# Patient Record
Sex: MALE | Race: ASIAN | NOT HISPANIC OR LATINO | Employment: UNEMPLOYED | URBAN - METROPOLITAN AREA
[De-identification: names, ages, dates, MRNs, and addresses within clinical notes are randomized per-mention and may not be internally consistent; named-entity substitution may affect disease eponyms.]

---

## 2017-02-28 ENCOUNTER — OFFICE VISIT - HEALTHEAST (OUTPATIENT)
Dept: FAMILY MEDICINE | Facility: CLINIC | Age: 72
End: 2017-02-28

## 2017-02-28 DIAGNOSIS — R05.9 COUGH: ICD-10-CM

## 2017-02-28 DIAGNOSIS — R06.02 SOB (SHORTNESS OF BREATH): ICD-10-CM

## 2017-02-28 ASSESSMENT — MIFFLIN-ST. JEOR: SCORE: 1143.45

## 2017-03-21 ENCOUNTER — OFFICE VISIT - HEALTHEAST (OUTPATIENT)
Dept: FAMILY MEDICINE | Facility: CLINIC | Age: 72
End: 2017-03-21

## 2017-03-21 DIAGNOSIS — M17.0 OSTEOARTHRITIS OF KNEES, BILATERAL: ICD-10-CM

## 2017-03-21 DIAGNOSIS — I10 HYPERTENSION: ICD-10-CM

## 2017-03-21 ASSESSMENT — MIFFLIN-ST. JEOR: SCORE: 1146.56

## 2017-08-09 ENCOUNTER — COMMUNICATION - HEALTHEAST (OUTPATIENT)
Dept: FAMILY MEDICINE | Facility: CLINIC | Age: 72
End: 2017-08-09

## 2017-08-09 DIAGNOSIS — E78.00 HYPERCHOLESTEREMIA: ICD-10-CM

## 2017-08-09 DIAGNOSIS — I63.81 LACUNAR INFARCTION (H): ICD-10-CM

## 2017-09-06 ENCOUNTER — COMMUNICATION - HEALTHEAST (OUTPATIENT)
Dept: FAMILY MEDICINE | Facility: CLINIC | Age: 72
End: 2017-09-06

## 2017-09-06 DIAGNOSIS — K29.70 GASTRITIS: ICD-10-CM

## 2017-09-06 DIAGNOSIS — F43.21 ADJUSTMENT DISORDER WITH DEPRESSED MOOD: ICD-10-CM

## 2018-01-25 ENCOUNTER — COMMUNICATION - HEALTHEAST (OUTPATIENT)
Dept: FAMILY MEDICINE | Facility: CLINIC | Age: 73
End: 2018-01-25

## 2018-01-25 DIAGNOSIS — E78.00 HYPERCHOLESTEREMIA: ICD-10-CM

## 2018-01-25 DIAGNOSIS — I63.81 LACUNAR INFARCTION (H): ICD-10-CM

## 2018-02-26 ENCOUNTER — COMMUNICATION - HEALTHEAST (OUTPATIENT)
Dept: FAMILY MEDICINE | Facility: CLINIC | Age: 73
End: 2018-02-26

## 2018-02-26 DIAGNOSIS — M17.0 OSTEOARTHRITIS OF KNEES, BILATERAL: ICD-10-CM

## 2018-02-26 DIAGNOSIS — F43.21 ADJUSTMENT DISORDER WITH DEPRESSED MOOD: ICD-10-CM

## 2018-02-26 DIAGNOSIS — K29.70 GASTRITIS: ICD-10-CM

## 2018-02-26 DIAGNOSIS — I10 HYPERTENSION: ICD-10-CM

## 2018-04-25 ENCOUNTER — COMMUNICATION - HEALTHEAST (OUTPATIENT)
Dept: FAMILY MEDICINE | Facility: CLINIC | Age: 73
End: 2018-04-25

## 2018-04-25 DIAGNOSIS — E78.00 HYPERCHOLESTEREMIA: ICD-10-CM

## 2018-04-25 DIAGNOSIS — I63.81 LACUNAR INFARCTION (H): ICD-10-CM

## 2018-05-23 ENCOUNTER — COMMUNICATION - HEALTHEAST (OUTPATIENT)
Dept: FAMILY MEDICINE | Facility: CLINIC | Age: 73
End: 2018-05-23

## 2018-05-23 DIAGNOSIS — F43.21 ADJUSTMENT DISORDER WITH DEPRESSED MOOD: ICD-10-CM

## 2018-05-23 DIAGNOSIS — I10 HYPERTENSION: ICD-10-CM

## 2018-05-23 DIAGNOSIS — K29.70 GASTRITIS: ICD-10-CM

## 2018-07-18 ENCOUNTER — COMMUNICATION - HEALTHEAST (OUTPATIENT)
Dept: FAMILY MEDICINE | Facility: CLINIC | Age: 73
End: 2018-07-18

## 2018-07-18 DIAGNOSIS — I63.81 LACUNAR INFARCTION (H): ICD-10-CM

## 2018-07-18 DIAGNOSIS — E78.00 HYPERCHOLESTEREMIA: ICD-10-CM

## 2018-08-18 ENCOUNTER — COMMUNICATION - HEALTHEAST (OUTPATIENT)
Dept: FAMILY MEDICINE | Facility: CLINIC | Age: 73
End: 2018-08-18

## 2018-08-18 DIAGNOSIS — I10 HYPERTENSION: ICD-10-CM

## 2018-08-18 DIAGNOSIS — K29.70 GASTRITIS: ICD-10-CM

## 2018-08-18 DIAGNOSIS — F43.21 ADJUSTMENT DISORDER WITH DEPRESSED MOOD: ICD-10-CM

## 2019-02-05 ENCOUNTER — COMMUNICATION - HEALTHEAST (OUTPATIENT)
Dept: FAMILY MEDICINE | Facility: CLINIC | Age: 74
End: 2019-02-05

## 2019-02-05 DIAGNOSIS — M17.0 OSTEOARTHRITIS OF KNEES, BILATERAL: ICD-10-CM

## 2019-02-25 ENCOUNTER — AMBULATORY - HEALTHEAST (OUTPATIENT)
Dept: CARE COORDINATION | Facility: CLINIC | Age: 74
End: 2019-02-25

## 2019-02-25 ENCOUNTER — COMMUNICATION - HEALTHEAST (OUTPATIENT)
Dept: FAMILY MEDICINE | Facility: CLINIC | Age: 74
End: 2019-02-25

## 2019-02-26 ENCOUNTER — COMMUNICATION - HEALTHEAST (OUTPATIENT)
Dept: CARE COORDINATION | Facility: CLINIC | Age: 74
End: 2019-02-26

## 2019-02-27 ENCOUNTER — OFFICE VISIT - HEALTHEAST (OUTPATIENT)
Dept: FAMILY MEDICINE | Facility: CLINIC | Age: 74
End: 2019-02-27

## 2019-02-27 DIAGNOSIS — K56.609 SBO (SMALL BOWEL OBSTRUCTION) (H): ICD-10-CM

## 2019-02-27 DIAGNOSIS — Z12.11 SCREEN FOR COLON CANCER: ICD-10-CM

## 2019-02-27 ASSESSMENT — MIFFLIN-ST. JEOR: SCORE: 1144.29

## 2019-04-26 ENCOUNTER — COMMUNICATION - HEALTHEAST (OUTPATIENT)
Dept: FAMILY MEDICINE | Facility: CLINIC | Age: 74
End: 2019-04-26

## 2019-04-29 ENCOUNTER — OFFICE VISIT - HEALTHEAST (OUTPATIENT)
Dept: FAMILY MEDICINE | Facility: CLINIC | Age: 74
End: 2019-04-29

## 2019-04-29 DIAGNOSIS — D64.9 ANEMIA, UNSPECIFIED TYPE: ICD-10-CM

## 2019-04-29 DIAGNOSIS — Z87.19 HISTORY OF GI BLEED: ICD-10-CM

## 2019-04-29 DIAGNOSIS — M17.0 PRIMARY OSTEOARTHRITIS OF BOTH KNEES: ICD-10-CM

## 2019-04-29 DIAGNOSIS — R10.84 ABDOMINAL PAIN, GENERALIZED: ICD-10-CM

## 2019-04-29 LAB
ERYTHROCYTE [DISTWIDTH] IN BLOOD BY AUTOMATED COUNT: 12.1 % (ref 11–14.5)
HCT VFR BLD AUTO: 37.4 % (ref 40–54)
HGB BLD-MCNC: 12.6 G/DL (ref 14–18)
MCH RBC QN AUTO: 32.3 PG (ref 27–34)
MCHC RBC AUTO-ENTMCNC: 33.8 G/DL (ref 32–36)
MCV RBC AUTO: 96 FL (ref 80–100)
PLATELET # BLD AUTO: 171 THOU/UL (ref 140–440)
PMV BLD AUTO: 8.3 FL (ref 7–10)
RBC # BLD AUTO: 3.91 MILL/UL (ref 4.4–6.2)
WBC: 4.8 THOU/UL (ref 4–11)

## 2019-04-29 ASSESSMENT — MIFFLIN-ST. JEOR: SCORE: 1122.18

## 2019-05-01 ENCOUNTER — RECORDS - HEALTHEAST (OUTPATIENT)
Dept: ADMINISTRATIVE | Facility: OTHER | Age: 74
End: 2019-05-01

## 2019-05-20 ENCOUNTER — AMBULATORY - HEALTHEAST (OUTPATIENT)
Dept: NURSING | Facility: CLINIC | Age: 74
End: 2019-05-20

## 2019-05-20 ENCOUNTER — COMMUNICATION - HEALTHEAST (OUTPATIENT)
Dept: FAMILY MEDICINE | Facility: CLINIC | Age: 74
End: 2019-05-20

## 2019-06-03 ENCOUNTER — OFFICE VISIT - HEALTHEAST (OUTPATIENT)
Dept: FAMILY MEDICINE | Facility: CLINIC | Age: 74
End: 2019-06-03

## 2019-06-03 DIAGNOSIS — Z87.19 HISTORY OF GI BLEED: ICD-10-CM

## 2019-06-03 DIAGNOSIS — K29.70 GASTRITIS, PRESENCE OF BLEEDING UNSPECIFIED, UNSPECIFIED CHRONICITY, UNSPECIFIED GASTRITIS TYPE: ICD-10-CM

## 2019-06-03 DIAGNOSIS — F43.21 ADJUSTMENT DISORDER WITH DEPRESSED MOOD: ICD-10-CM

## 2019-06-03 ASSESSMENT — MIFFLIN-ST. JEOR: SCORE: 1126.72

## 2019-07-25 ENCOUNTER — COMMUNICATION - HEALTHEAST (OUTPATIENT)
Dept: FAMILY MEDICINE | Facility: CLINIC | Age: 74
End: 2019-07-25

## 2019-07-25 DIAGNOSIS — I63.81 LACUNAR INFARCTION (H): ICD-10-CM

## 2019-07-25 DIAGNOSIS — E78.00 HYPERCHOLESTEREMIA: ICD-10-CM

## 2019-07-25 DIAGNOSIS — K29.70 GASTRITIS: ICD-10-CM

## 2019-07-25 DIAGNOSIS — I10 HYPERTENSION: ICD-10-CM

## 2019-08-22 ENCOUNTER — COMMUNICATION - HEALTHEAST (OUTPATIENT)
Dept: FAMILY MEDICINE | Facility: CLINIC | Age: 74
End: 2019-08-22

## 2019-08-22 DIAGNOSIS — F43.21 ADJUSTMENT DISORDER WITH DEPRESSED MOOD: ICD-10-CM

## 2019-10-02 ENCOUNTER — OFFICE VISIT - HEALTHEAST (OUTPATIENT)
Dept: FAMILY MEDICINE | Facility: CLINIC | Age: 74
End: 2019-10-02

## 2019-10-02 DIAGNOSIS — K29.70 GASTRITIS, PRESENCE OF BLEEDING UNSPECIFIED, UNSPECIFIED CHRONICITY, UNSPECIFIED GASTRITIS TYPE: ICD-10-CM

## 2019-10-02 DIAGNOSIS — I10 ESSENTIAL HYPERTENSION: ICD-10-CM

## 2019-10-02 DIAGNOSIS — M17.0 PRIMARY OSTEOARTHRITIS OF BOTH KNEES: ICD-10-CM

## 2019-10-02 DIAGNOSIS — Z87.19 HISTORY OF GI BLEED: ICD-10-CM

## 2019-10-02 DIAGNOSIS — F43.21 ADJUSTMENT DISORDER WITH DEPRESSED MOOD: ICD-10-CM

## 2019-10-02 ASSESSMENT — MIFFLIN-ST. JEOR: SCORE: 1161.31

## 2019-11-20 ENCOUNTER — AMBULATORY - HEALTHEAST (OUTPATIENT)
Dept: OTHER | Facility: CLINIC | Age: 74
End: 2019-11-20

## 2020-02-03 ENCOUNTER — OFFICE VISIT - HEALTHEAST (OUTPATIENT)
Dept: FAMILY MEDICINE | Facility: CLINIC | Age: 75
End: 2020-02-03

## 2020-02-03 DIAGNOSIS — F43.21 ADJUSTMENT DISORDER WITH DEPRESSED MOOD: ICD-10-CM

## 2020-02-03 DIAGNOSIS — I10 HYPERTENSION: ICD-10-CM

## 2020-02-03 DIAGNOSIS — I63.81 LACUNAR INFARCTION (H): ICD-10-CM

## 2020-02-03 DIAGNOSIS — K08.9 DENTAL DISEASE: ICD-10-CM

## 2020-02-03 DIAGNOSIS — E78.00 HYPERCHOLESTEREMIA: ICD-10-CM

## 2020-02-03 DIAGNOSIS — I10 ESSENTIAL HYPERTENSION: ICD-10-CM

## 2020-02-03 DIAGNOSIS — Z87.19 HISTORY OF GI BLEED: ICD-10-CM

## 2020-02-03 DIAGNOSIS — Z00.00 ROUTINE GENERAL MEDICAL EXAMINATION AT A HEALTH CARE FACILITY: ICD-10-CM

## 2020-02-03 DIAGNOSIS — K29.70 GASTRITIS: ICD-10-CM

## 2020-02-03 ASSESSMENT — MIFFLIN-ST. JEOR: SCORE: 1175.48

## 2020-02-03 ASSESSMENT — PATIENT HEALTH QUESTIONNAIRE - PHQ9: SUM OF ALL RESPONSES TO PHQ QUESTIONS 1-9: 14

## 2020-03-05 ENCOUNTER — OFFICE VISIT - HEALTHEAST (OUTPATIENT)
Dept: FAMILY MEDICINE | Facility: CLINIC | Age: 75
End: 2020-03-05

## 2020-03-05 DIAGNOSIS — I10 ESSENTIAL HYPERTENSION: ICD-10-CM

## 2020-03-05 DIAGNOSIS — J31.0 CHRONIC RHINITIS: ICD-10-CM

## 2020-03-05 LAB
ALBUMIN SERPL-MCNC: 3.7 G/DL (ref 3.5–5)
ALP SERPL-CCNC: 92 U/L (ref 45–120)
ALT SERPL W P-5'-P-CCNC: 23 U/L (ref 0–45)
ANION GAP SERPL CALCULATED.3IONS-SCNC: 9 MMOL/L (ref 5–18)
AST SERPL W P-5'-P-CCNC: 31 U/L (ref 0–40)
BILIRUB SERPL-MCNC: 0.7 MG/DL (ref 0–1)
BUN SERPL-MCNC: 10 MG/DL (ref 8–28)
CALCIUM SERPL-MCNC: 9.1 MG/DL (ref 8.5–10.5)
CHLORIDE BLD-SCNC: 103 MMOL/L (ref 98–107)
CO2 SERPL-SCNC: 27 MMOL/L (ref 22–31)
CREAT SERPL-MCNC: 1.03 MG/DL (ref 0.7–1.3)
GFR SERPL CREATININE-BSD FRML MDRD: >60 ML/MIN/1.73M2
GLUCOSE BLD-MCNC: 64 MG/DL (ref 70–125)
LDLC SERPL CALC-MCNC: 95 MG/DL
POTASSIUM BLD-SCNC: 4.1 MMOL/L (ref 3.5–5)
PROT SERPL-MCNC: 7.3 G/DL (ref 6–8)
SODIUM SERPL-SCNC: 139 MMOL/L (ref 136–145)

## 2020-03-05 ASSESSMENT — MIFFLIN-ST. JEOR: SCORE: 1167.54

## 2020-04-27 ENCOUNTER — RECORDS - HEALTHEAST (OUTPATIENT)
Dept: ADMINISTRATIVE | Facility: OTHER | Age: 75
End: 2020-04-27

## 2020-05-21 ENCOUNTER — RECORDS - HEALTHEAST (OUTPATIENT)
Dept: ADMINISTRATIVE | Facility: OTHER | Age: 75
End: 2020-05-21

## 2020-07-13 ENCOUNTER — RECORDS - HEALTHEAST (OUTPATIENT)
Dept: ADMINISTRATIVE | Facility: OTHER | Age: 75
End: 2020-07-13

## 2020-09-15 ENCOUNTER — OFFICE VISIT - HEALTHEAST (OUTPATIENT)
Dept: FAMILY MEDICINE | Facility: CLINIC | Age: 75
End: 2020-09-15

## 2020-09-15 DIAGNOSIS — K08.89 PAIN, DENTAL: ICD-10-CM

## 2020-09-15 DIAGNOSIS — F43.21 ADJUSTMENT DISORDER WITH DEPRESSED MOOD: ICD-10-CM

## 2020-09-15 DIAGNOSIS — Z87.19 HISTORY OF GI BLEED: ICD-10-CM

## 2020-09-15 DIAGNOSIS — K08.9 DENTAL DISEASE: ICD-10-CM

## 2020-09-15 DIAGNOSIS — K29.70 GASTRITIS: ICD-10-CM

## 2020-09-15 DIAGNOSIS — E78.00 HYPERCHOLESTEREMIA: ICD-10-CM

## 2020-09-15 DIAGNOSIS — Z28.39 IMMUNIZATION DEFICIENCY: ICD-10-CM

## 2020-09-15 DIAGNOSIS — I10 HYPERTENSION: ICD-10-CM

## 2020-09-15 DIAGNOSIS — I63.81 LACUNAR INFARCTION (H): ICD-10-CM

## 2020-09-15 RX ORDER — ATORVASTATIN CALCIUM 80 MG/1
80 TABLET, FILM COATED ORAL DAILY
Qty: 90 TABLET | Refills: 3 | Status: SHIPPED | OUTPATIENT
Start: 2020-09-15 | End: 2021-09-20

## 2020-09-15 RX ORDER — AMLODIPINE BESYLATE 5 MG/1
5 TABLET ORAL DAILY
Qty: 90 TABLET | Refills: 3 | Status: SHIPPED | OUTPATIENT
Start: 2020-09-15 | End: 2021-09-20

## 2020-09-15 RX ORDER — ESCITALOPRAM OXALATE 5 MG/1
5 TABLET ORAL DAILY
Qty: 90 TABLET | Refills: 3 | Status: SHIPPED | OUTPATIENT
Start: 2020-09-15 | End: 2021-09-20

## 2020-09-15 ASSESSMENT — MIFFLIN-ST. JEOR: SCORE: 1148.26

## 2020-09-15 ASSESSMENT — PATIENT HEALTH QUESTIONNAIRE - PHQ9: SUM OF ALL RESPONSES TO PHQ QUESTIONS 1-9: 1

## 2020-11-04 ENCOUNTER — RECORDS - HEALTHEAST (OUTPATIENT)
Dept: ADMINISTRATIVE | Facility: OTHER | Age: 75
End: 2020-11-04

## 2021-01-12 ENCOUNTER — COMMUNICATION - HEALTHEAST (OUTPATIENT)
Dept: FAMILY MEDICINE | Facility: CLINIC | Age: 76
End: 2021-01-12

## 2021-01-12 DIAGNOSIS — K29.70 GASTRITIS: ICD-10-CM

## 2021-01-12 DIAGNOSIS — Z87.19 HISTORY OF GI BLEED: ICD-10-CM

## 2021-02-16 ENCOUNTER — RECORDS - HEALTHEAST (OUTPATIENT)
Dept: ADMINISTRATIVE | Facility: OTHER | Age: 76
End: 2021-02-16

## 2021-02-27 ENCOUNTER — AMBULATORY - HEALTHEAST (OUTPATIENT)
Dept: NURSING | Facility: CLINIC | Age: 76
End: 2021-02-27

## 2021-03-12 ENCOUNTER — RECORDS - HEALTHEAST (OUTPATIENT)
Dept: ADMINISTRATIVE | Facility: OTHER | Age: 76
End: 2021-03-12

## 2021-03-15 ENCOUNTER — OFFICE VISIT - HEALTHEAST (OUTPATIENT)
Dept: FAMILY MEDICINE | Facility: CLINIC | Age: 76
End: 2021-03-15

## 2021-03-15 DIAGNOSIS — I10 ESSENTIAL HYPERTENSION: ICD-10-CM

## 2021-03-15 DIAGNOSIS — F43.21 ADJUSTMENT DISORDER WITH DEPRESSED MOOD: ICD-10-CM

## 2021-03-15 DIAGNOSIS — Z11.1 SCREENING FOR TUBERCULOSIS: ICD-10-CM

## 2021-03-15 DIAGNOSIS — Z87.19 HISTORY OF GI BLEED: ICD-10-CM

## 2021-03-15 LAB
ANION GAP SERPL CALCULATED.3IONS-SCNC: 11 MMOL/L (ref 5–18)
BUN SERPL-MCNC: 10 MG/DL (ref 8–28)
CALCIUM SERPL-MCNC: 8.9 MG/DL (ref 8.5–10.5)
CHLORIDE BLD-SCNC: 106 MMOL/L (ref 98–107)
CO2 SERPL-SCNC: 21 MMOL/L (ref 22–31)
CREAT SERPL-MCNC: 0.88 MG/DL (ref 0.7–1.3)
GFR SERPL CREATININE-BSD FRML MDRD: >60 ML/MIN/1.73M2
GLUCOSE BLD-MCNC: 84 MG/DL (ref 70–125)
POTASSIUM BLD-SCNC: 4.2 MMOL/L (ref 3.5–5)
SODIUM SERPL-SCNC: 138 MMOL/L (ref 136–145)

## 2021-03-15 ASSESSMENT — MIFFLIN-ST. JEOR: SCORE: 1166.41

## 2021-03-17 ENCOUNTER — COMMUNICATION - HEALTHEAST (OUTPATIENT)
Dept: FAMILY MEDICINE | Facility: CLINIC | Age: 76
End: 2021-03-17

## 2021-03-17 LAB
GAMMA INTERFERON BACKGROUND BLD IA-ACNC: 0.17 IU/ML
M TB IFN-G BLD-IMP: NEGATIVE
MITOGEN IGNF BCKGRD COR BLD-ACNC: 0 IU/ML
MITOGEN IGNF BCKGRD COR BLD-ACNC: 0.09 IU/ML
QTF INTERPRETATION: NORMAL
QTF MITOGEN - NIL: 7.29 IU/ML

## 2021-03-20 ENCOUNTER — AMBULATORY - HEALTHEAST (OUTPATIENT)
Dept: NURSING | Facility: CLINIC | Age: 76
End: 2021-03-20

## 2021-04-27 ENCOUNTER — RECORDS - HEALTHEAST (OUTPATIENT)
Dept: ADMINISTRATIVE | Facility: OTHER | Age: 76
End: 2021-04-27

## 2021-05-06 ENCOUNTER — RECORDS - HEALTHEAST (OUTPATIENT)
Dept: ADMINISTRATIVE | Facility: OTHER | Age: 76
End: 2021-05-06

## 2021-05-27 ASSESSMENT — PATIENT HEALTH QUESTIONNAIRE - PHQ9
SUM OF ALL RESPONSES TO PHQ QUESTIONS 1-9: 1
SUM OF ALL RESPONSES TO PHQ QUESTIONS 1-9: 14

## 2021-05-28 NOTE — TELEPHONE ENCOUNTER
Who is calling:  [facility] COORDINATION    Reason for Call:  WILL BE ORDERING  A WALKER FOR AMBULATION USING CRUNCH AND CANE FOR AMBULATION SCARED OF THE FALL RISK CARE COORDINATIOR WILL BE ORDERING WALKER. ALSO WILL BE ORDERING 20 PULL UPS DUE TO INCONTINENT DRIBBLING, MORE THEN 1 ONCE A WEEK NOT DAILY, DIDN'T SAY HE WAS DEPRESSED BUT HAS SIGNS OF DEPRESSION JUST SLEEPING A LOT AND DENIES ANY SUCIDE , DECLINED BEHAVIORAL HELP SUPPORT ALSO, WOULD LIKE TO HAVE THE HEIGHT AND WEIGHT TO ORDER THE WALKER  , HOME VISIT MADE ON 04/22/19  Date of last appointment with primary care: 02/27/19  Okay to leave a detailed message: Yes

## 2021-05-28 NOTE — TELEPHONE ENCOUNTER
"Patient brought Go Lytely large plastic bottle as welll as Magnesium Citrate, Miralax, and Dulcolax for prep.  Instructions didn't include last three.    Spoke with Whit, who transferred me to [facility] at [phone].    Called that number and spoke with Carmen, who reported that patient only takes GoLytely.    Put other prescriptions into bag and labeled \"Do Not Drink\".      Via , gave instructions for prep per one page document brought with patient.   Printed copy of current med/allergy list.    Patient and daughter indicated understanding.    Thank you.  "

## 2021-05-28 NOTE — PROGRESS NOTES
See telephone encounter this day for details.  Patient and daughter indicated understanding of prep.

## 2021-05-28 NOTE — PROGRESS NOTES
ASSESMENT AND PLAN:  Diagnoses and all orders for this visit:    Abdominal pain, generalized  -     Ambulatory referral to Gastroenterology  -     2(CBC w/o Differential)    History of GI bleed - status post emergent laprotomy in [place] in 2005  -     Ambulatory referral to Gastroenterology  -     [facility](CBC w/o Differential)    Anemia, unspecified type  -     Ambulatory referral to Gastroenterology  -     2(CBC w/o Differential) done today shows a improvement to 12.6 hemoglobin as compared to 12.1 back in February.    Mental health addendum- note received from the [facility]  indicating that the patient's family had indicated during a home visit that he is been having some depression and some suicidal thinking when not feeling well.  I received this letter after the patient visit, the patient is scheduled for follow-up with me in the clinic and we will address this further at that time.  Please see that letter for details, at the time a referral for behavioral health had been recommended but the patient declined.      Primary osteoarthritis of both knees  Because of the concern about GI bleeding detailed below, meloxicam is being discontinued and replaced with acetaminophen as ordered below.  -     acetaminophen (TYLENOL EXTRA STRENGTH) 500 MG tablet; Take 1 tablet (500 mg total) by mouth every 6 (six) hours as needed for pain.  Dispense: 120 tablet; Refill: 6    Patient had previously been scheduled for colonoscopy but because of transportation/navigation issues it had to be canceled and rescheduled.  However, that was just colonoscopy.  Given the possible melena described below and given his history of upper GI bleed in the past, and given his anemia, I want him to have both upper endoscopy and colonoscopy with the same sedation.  Detailed counseling done today with the patient and his caregiver with the help of a professional  and they agree with the plan.  I  discussed the case with our specialty scheduling team who is coordinating the rescheduling of everything with [facility].  Counseling done with the patient on indications for routine and emergent follow-up.  Discontinuation of meloxicam as detailed above, I am going to have him continue the 81 mg baby aspirin daily for now because of his history of stroke and his stable hemoglobin.    Over 40 minutes of total face-to-face time, more than half in counseling and coordination of care on the above.     SUBJECTIVE: 74-year-old male who I have not seen in a long time here in the clinic comes in today for follow-up.  He had been seen by my partner here in the clinic recently, reviewed that clinic note, that was for a hospital follow-up visit after he was hospitalized with small bowel obstruction.  The patient has not had any recurrence of small bowel obstruction.  He is having daily regular bowel movements.  He does report that sometimes the bowel movements are black in color.  He has not seen any red blood in the stool and has not had any vomiting.  He is getting mid upper abdominal pain lasting for about 3 to 4 minutes about once per week.  The pain is mild to moderate in severity.  The patient has a concerning history, he had to have emergent open abdominal surgery in [place] in the past apparently secondary to acute bleeding ulcer.  He is taking omeprazole every day.  Patient has been taking meloxicam for his knee arthritis.  He gets good improvement in his knee pain from this medication.  As part of his hospital follow-up, colonoscopy had been scheduled but because the patient faces transportation and language barriers he was unable to navigate this and they had been working on rescheduling the colonoscopy.    No past medical history on file.  Patient Active Problem List   Diagnosis     Lacunar infarction     Refugee health examination     Gastritis     Adjustment disorder with depressed mood  "    Right shoulder tendonitis     History of GI bleed - status post emergent [facility]omy [facility] in 2005     Hypertension     Osteoarthritis of knees, bilateral     SBO (small bowel obstruction) (H)     Low magnesium level     Hypoglycemia     Current Outpatient Medications   Medication Sig Dispense Refill     amLODIPine (NORVASC) 2.5 MG tablet TAKE 1 TABLET (2.5 MG TOTAL) BY MOUTH DAILY FOR BLOOD PRESSURE 90 tablet 3     ASPIR-LOW 81 mg EC tablet TAKE 1 PILL BY MOUTH EVERYDAY 90 tablet 3     atorvastatin (LIPITOR) 80 MG tablet TAKE 1 TABLET (80 MG TOTAL) BY MOUTH BEDTIME FOR CHOLESTEROL 90 tablet 3     escitalopram oxalate (LEXAPRO) 5 MG tablet TAKE 1 TABLET (5 MG TOTAL) BY MOUTH DAILY FOR DEPRESSION 90 tablet 3     omeprazole (PRILOSEC) 20 MG capsule TAKE 1 CAPSULE (20 MG TOTAL) BY MOUTH 2 (TWO) TIMES A DAY FOR STOMACH 180 capsule 3     acetaminophen (TYLENOL EXTRA STRENGTH) 500 MG tablet Take 1 tablet (500 mg total) by mouth every 6 (six) hours as needed for pain. 120 tablet 6     polyethylene glycol (MIRALAX) 17 gram packet Take 1 packet (17 g total) by mouth daily.  0     No current facility-administered medications for this visit.      Social History     Tobacco Use   Smoking Status Current Every Day Smoker     Types: Pipe   Smokeless Tobacco Current User     Types: Chew   Tobacco Comment    smokes pipe twice a day, chews betel nut       OBJECTICE: /70 (Patient Site: Left Arm, Patient Position: Sitting, Cuff Size: Adult Regular)   Pulse 71   Temp 97.9  F (36.6  C) (Oral)   Resp 20   Ht 4' 10.25\" (1.48 m)   Wt 124 lb (56.2 kg)   SpO2 97%   BMI 25.69 kg/m       Recent Results (from the past 24 hour(s))   HM2(CBC w/o Differential)    Collection Time: 04/29/19  1:45 PM   Result Value Ref Range    WBC 4.8 4.0 - 11.0 thou/uL    RBC 3.91 (L) 4.40 - 6.20 mill/uL    Hemoglobin 12.6 (L) 14.0 - 18.0 g/dL    Hematocrit 37.4 (L) 40.0 - 54.0 %    MCV 96 80 - 100 fL    MCH 32.3 27.0 - 34.0 pg    MCHC 33.8 32.0 " - 36.0 g/dL    RDW 12.1 11.0 - 14.5 %    Platelets 171 140 - 440 thou/uL    MPV 8.3 7.0 - 10.0 fL        GEN-alert, appropriate, in no apparent distress   HEENT-mucous members are moist, neck is supple   CV-regular rate and rhythm   RESP-lungs clear to auscultation   ABDOMINAL-soft, nontender, no palpable mass   EXTREM-warm, no ankle edema       oJse Snyder

## 2021-05-29 NOTE — PROGRESS NOTES
ASSESMENT AND PLAN:  Diagnoses and all orders for this visit:    Gastritis, presence of bleeding unspecified, unspecified chronicity, unspecified gastritis type    History of GI bleed - status post emergent laprotomy in [facility] in 2005    Adjustment disorder with depressed mood    Patient symptoms have improved as detailed below.  He has decided not to proceed with EGD and colonoscopy.  We discussed indications for follow-up.  Medication review and counseling done with the patient.  Given his history of GI bleed we are going to discontinue aspirin permanently and discontinue NSAIDs permanently.  Did add aspirin to his allergy list  As a contraindication.  Counseling done today on his mood.  He feels like this is also improving.  He is going to continue to take his 5 mg daily of escitalopram and will follow up closely here in the clinic.  We reviewed indications for routine and emergent follow-up.      SUBJECTIVE: 74-year-old male comes in today for follow-up.  He has had resolution of his abdominal pain and has not been having any blood in the stool or black tarry stools since his last visit.  Therefore, he has decided he does not want to proceed with the upper endoscopy and colonoscopy that had been recommended previously.  He does have a past history of known GI bleed that was serious.  Therefore, we have decided to discontinue NSAIDs and aspirin.  Previously there had been a concern raised by home nursing staff about depressed mood.  Patient acknowledges some depression.  However, he feels like it has been improving and when he is able to do more things with peers and family he feels less depression and more engaged in his daily life.    No past medical history on file.  Patient Active Problem List   Diagnosis     Lacunar infarction     Refugee health examination     Gastritis     Adjustment disorder with depressed mood     Right shoulder tendonitis     History of GI bleed - status post emergent laprotomy in  "[place] in 2005     Hypertension     Osteoarthritis of knees, bilateral     SBO (small bowel obstruction) (H)     Low magnesium level     Hypoglycemia     Current Outpatient Medications   Medication Sig Dispense Refill     amLODIPine (NORVASC) 2.5 MG tablet TAKE 1 TABLET (2.5 MG TOTAL) BY MOUTH DAILY FOR BLOOD PRESSURE 90 tablet 3     atorvastatin (LIPITOR) 80 MG tablet TAKE 1 TABLET (80 MG TOTAL) BY MOUTH BEDTIME FOR CHOLESTEROL 90 tablet 3     escitalopram oxalate (LEXAPRO) 5 MG tablet TAKE 1 TABLET (5 MG TOTAL) BY MOUTH DAILY FOR DEPRESSION 90 tablet 3     omeprazole (PRILOSEC) 20 MG capsule TAKE 1 CAPSULE (20 MG TOTAL) BY MOUTH 2 (TWO) TIMES A DAY FOR STOMACH 180 capsule 3     acetaminophen (TYLENOL EXTRA STRENGTH) 500 MG tablet Take 1 tablet (500 mg total) by mouth every 6 (six) hours as needed for pain. 120 tablet 6     polyethylene glycol (MIRALAX) 17 gram packet Take 1 packet (17 g total) by mouth daily.  0     No current facility-administered medications for this visit.      Social History     Tobacco Use   Smoking Status Current Every Day Smoker     Types: Pipe   Smokeless Tobacco Current User     Types: Chew   Tobacco Comment    smokes pipe twice a day, chews betel nut       OBJECTICE: /60 (Patient Site: Left Arm)   Pulse 77   Temp 98  F (36.7  C) (Oral)   Resp 20   Ht 4' 10.25\" (1.48 m)   Wt 125 lb (56.7 kg)   SpO2 98%   BMI 25.90 kg/m       No results found for this or any previous visit (from the past 24 hour(s)).     GEN-alert, appropriate, in no apparent distress   CV-regular rate and rhythm with no murmur   RESP-lungs clear to auscultation   ABDOMINAL-soft and nontender with no palpable mass or organomegaly   Psychiatric-appearance is well-groomed, speech of normal fluency and rate, mood is depressed, affect is flat, thought content negative for suicidal or homicidal ideation, thought processing negative for paranoid or delusional thinking.      Jose Snyder"

## 2021-05-30 VITALS — HEIGHT: 60 IN | BODY MASS INDEX: 24.75 KG/M2 | WEIGHT: 126.06 LBS

## 2021-05-30 VITALS — HEIGHT: 60 IN | WEIGHT: 126.75 LBS | BODY MASS INDEX: 24.88 KG/M2

## 2021-05-30 NOTE — TELEPHONE ENCOUNTER
Refill Approved    Rx renewed per Medication Renewal Policy. Medication was last renewed on 7/20/2018.    Gunner Cottrell, [facility] Triage/Med Refill 7/25/2019     Requested Prescriptions   Pending Prescriptions Disp Refills     atorvastatin (LIPITOR) 80 MG tablet [Pharmacy Med Name: ATORVASTATIN 80 MG TABLET 80 TAB] 90 tablet 3     Sig: TAKE 1 TABLET (80 MG TOTAL) BY MOUTH BEDTIME FOR CHOLESTEROL       Statins Refill Protocol (Hmg CoA Reductase Inhibitors) Passed - 7/25/2019 10:09 AM        Passed - PCP or prescribing provider visit in past 12 months      Last office visit with prescriber/PCP: Visit date not found OR same dept: 6/3/2019 Jose Snyder MD OR same specialty: 6/3/2019 Jose Snyder MD  Last physical: Visit date not found Last MTM visit: Visit date not found   Next visit within 3 mo: Visit date not found  Next physical within 3 mo: Visit date not found  Prescriber OR PCP: Radha Izquierdo MD  Last diagnosis associated with med order: 1. Lacunar infarction (H)  - atorvastatin (LIPITOR) 80 MG tablet [Pharmacy Med Name: ATORVASTATIN 80 MG TABLET 80 TAB]; TAKE 1 TABLET (80 MG TOTAL) BY MOUTH BEDTIME FOR CHOLESTEROL  Dispense: 90 tablet; Refill: 3    2. Hypercholesteremia  - atorvastatin (LIPITOR) 80 MG tablet [Pharmacy Med Name: ATORVASTATIN 80 MG TABLET 80 TAB]; TAKE 1 TABLET (80 MG TOTAL) BY MOUTH BEDTIME FOR CHOLESTEROL  Dispense: 90 tablet; Refill: 3    If protocol passes may refill for 12 months if within 3 months of last provider visit (or a total of 15 months).

## 2021-05-30 NOTE — TELEPHONE ENCOUNTER
Refill Approved    Rx renewed per Medication Renewal Policy. Medication was last renewed on 8/20/18.    Gely Reynolds, [facility] Triage/Med Refill 7/25/2019     Requested Prescriptions   Pending Prescriptions Disp Refills     omeprazole (PRILOSEC) 20 MG capsule [Pharmacy Med Name: OMEPRAZOLE DR 20 MG CAPSULE 20 CAP] 180 capsule 3     Sig: TAKE 1 CAPSULE (20 MG TOTAL) BY MOUTH 2 (TWO) TIMES A DAY FOR STOMACH       GI Medications Refill Protocol Passed - 7/25/2019 10:10 AM        Passed - PCP or prescribing provider visit in last 12 or next 3 months.     Last office visit with prescriber/PCP: 6/3/2019 Jose Snyder MD OR same dept: 6/3/2019 Jose Snyder MD OR same specialty: 6/3/2019 Jose Snyder MD  Last physical: Visit date not found Last MTM visit: Visit date not found   Next visit within 3 mo: Visit date not found  Next physical within 3 mo: Visit date not found  Prescriber OR PCP: Jose Snyder MD  Last diagnosis associated with med order: 1. Gastritis  - omeprazole (PRILOSEC) 20 MG capsule [Pharmacy Med Name: OMEPRAZOLE DR 20 MG CAPSULE 20 CAP]; TAKE 1 CAPSULE (20 MG TOTAL) BY MOUTH 2 (TWO) TIMES A DAY FOR STOMACH  Dispense: 180 capsule; Refill: 3    2. Hypertension  - amLODIPine (NORVASC) 2.5 MG tablet [Pharmacy Med Name: AMLODIPINE BESYLATE 2.5 MG 2.5 TAB]; TAKE 1 TABLET (2.5 MG TOTAL) BY MOUTH DAILY FOR BLOOD PRESSURE  Dispense: 90 tablet; Refill: 3    If protocol passes may refill for 12 months if within 3 months of last provider visit (or a total of 15 months).             amLODIPine (NORVASC) 2.5 MG tablet [Pharmacy Med Name: AMLODIPINE BESYLATE 2.5 MG 2.5 TAB] 90 tablet 3     Sig: TAKE 1 TABLET (2.5 MG TOTAL) BY MOUTH DAILY FOR BLOOD PRESSURE       Calcium-Channel Blockers Protocol Passed - 7/25/2019 10:10 AM        Passed - PCP or prescribing provider visit in past 12 months or next 3 months     Last office visit with prescriber/PCP: 6/3/2019 Jose Snyder MD OR same dept: 6/3/2019  Jose Snyder MD OR same specialty: 6/3/2019 Jose Snyder MD  Last physical: Visit date not found Last MTM visit: Visit date not found   Next visit within 3 mo: Visit date not found  Next physical within 3 mo: Visit date not found  Prescriber OR PCP: Jose Snyder MD  Last diagnosis associated with med order: 1. Gastritis  - omeprazole (PRILOSEC) 20 MG capsule [Pharmacy Med Name: OMEPRAZOLE DR 20 MG CAPSULE 20 CAP]; TAKE 1 CAPSULE (20 MG TOTAL) BY MOUTH 2 (TWO) TIMES A DAY FOR STOMACH  Dispense: 180 capsule; Refill: 3    2. Hypertension  - amLODIPine (NORVASC) 2.5 MG tablet [Pharmacy Med Name: AMLODIPINE BESYLATE 2.5 MG 2.5 TAB]; TAKE 1 TABLET (2.5 MG TOTAL) BY MOUTH DAILY FOR BLOOD PRESSURE  Dispense: 90 tablet; Refill: 3    If protocol passes may refill for 12 months if within 3 months of last provider visit (or a total of 15 months).             Passed - Blood pressure filed in past 12 months     BP Readings from Last 1 Encounters:   06/03/19 112/60

## 2021-05-31 NOTE — TELEPHONE ENCOUNTER
Refill Approved    Rx renewed per Medication Renewal Policy. Medication was last renewed on 8/20/2018 with 3 refills.  Last office visit: 6/3/2019 with PCP DR QUAN Stearns, [facility] Triage/Med Refill 8/22/2019     Requested Prescriptions   Pending Prescriptions Disp Refills     escitalopram oxalate (LEXAPRO) 5 MG tablet [Pharmacy Med Name: ESCITALOPRAM 5 MG TABLET 5 TAB] 90 tablet 3     Sig: TAKE 1 TABLET (5 MG TOTAL) BY MOUTH DAILY FOR DEPRESSION       [facility] Refill Protocol  Passed - 8/22/2019 10:27 AM        Passed - PCP or prescribing provider visit in last year     Last office visit with prescriber/PCP: 6/3/2019 Jose Snyder MD OR same dept: 6/3/2019 Jose Snyder MD OR same specialty: 6/3/2019 Jose Snyder MD  Last physical: Visit date not found Last MTM visit: Visit date not found   Next visit within 3 mo: Visit date not found  Next physical within 3 mo: Visit date not found  Prescriber OR PCP: Jose Snyder MD  Last diagnosis associated with med order: 1. Adjustment disorder with depressed mood  - escitalopram oxalate (LEXAPRO) 5 MG tablet [Pharmacy Med Name: ESCITALOPRAM 5 MG TABLET 5 TAB]; TAKE 1 TABLET (5 MG TOTAL) BY MOUTH DAILY FOR DEPRESSION  Dispense: 90 tablet; Refill: 3    If protocol passes may refill for 12 months if within 3 months of last provider visit (or a total of 15 months).

## 2021-06-02 VITALS — HEIGHT: 60 IN | WEIGHT: 122.75 LBS | BODY MASS INDEX: 24.1 KG/M2

## 2021-06-02 NOTE — PROGRESS NOTES
ASSESMENT AND PLAN:  Diagnoses and all orders for this visit:    Primary osteoarthritis of both knees  Acetaminophen as prescribed below, disability parking sticker form completed today with the patient.  -     acetaminophen (TYLENOL EXTRA STRENGTH) 500 MG tablet; Take 1 tablet (500 mg total) by mouth every 6 (six) hours as needed for pain.  Dispense: 120 tablet; Refill: 6    Adjustment disorder with depressed mood  Improved.  Counseling done today with the patient.  Follow-up if worsening.    Essential hypertension  Well-controlled.  Continue current treatment plan and recheck again in 6 months.    Gastritis, presence of bleeding unspecified, unspecified chronicity, unspecified gastritis type, History of GI bleed - status post emergent laprotomy in [facility] in 2005  Counseling done with the patient on the need for lifelong omeprazole given the severity of his disease in the past.  Medication review and counseling done, he is currently taking the omeprazole and is currently asymptomatic.    Other orders  The patient.-     Influenza High Dose, Seasonal 65+ yrs          SUBJECTIVE: 74-year-old male comes in today for follow-up on several issues.  His mood is been steadily improving.  He feels like he is able to enjoy things in his daily life and is not feeling depressed.  He is sleeping well.  Patient has had complete resolution of his abdominal pain without recurrence since his last visit.  No black tarry stools or blood in the stool.  No vomiting.  He is taking omeprazole every day and tolerating the medication well.  His main concern is bilateral knee pain.  His daughter reports that it makes it hard for him to walk more than 20 or 30 feet without having to stop to rest.  When he is resting the pain is mild but it becomes more moderate when he is more physically active.  Currently he is not taking any medication for it.  The daughter worries about safety and pain is when he has to ambulate longer distances and is  "wondering about a disability parking sticker.  She is especially worried with the upcoming winter weather.  No chest pain, no shortness of breath.    No past medical history on file.  Patient Active Problem List   Diagnosis     Lacunar infarction (H)     Refugee health examination     Gastritis     Adjustment disorder with depressed mood     Right shoulder tendonitis     History of GI bleed - status post emergent laprotomy in [place] in 2005     Hypertension     Osteoarthritis of knees, bilateral     SBO (small bowel obstruction) (H)     Low magnesium level     Hypoglycemia     Current Outpatient Medications   Medication Sig Dispense Refill     acetaminophen (TYLENOL EXTRA STRENGTH) 500 MG tablet Take 1 tablet (500 mg total) by mouth every 6 (six) hours as needed for pain. 120 tablet 6     amLODIPine (NORVASC) 2.5 MG tablet TAKE 1 TABLET (2.5 MG TOTAL) BY MOUTH DAILY FOR BLOOD PRESSURE 90 tablet 3     atorvastatin (LIPITOR) 80 MG tablet TAKE 1 TABLET (80 MG TOTAL) BY MOUTH BEDTIME FOR CHOLESTEROL 90 tablet 3     escitalopram oxalate (LEXAPRO) 5 MG tablet Take 1 tablet (5 mg total) by mouth daily. For depression 90 tablet 3     omeprazole (PRILOSEC) 20 MG capsule TAKE 1 CAPSULE (20 MG TOTAL) BY MOUTH 2 (TWO) TIMES A DAY FOR STOMACH 180 capsule 3     polyethylene glycol (MIRALAX) 17 gram packet Take 1 packet (17 g total) by mouth daily.  0     No current facility-administered medications for this visit.      Social History     Tobacco Use   Smoking Status Current Every Day Smoker     Types: Pipe   Smokeless Tobacco Current User     Types: Chew   Tobacco Comment    smokes pipe twice a day, chews betel nut       OBJECTICE: /66 (Patient Site: Right Arm)   Pulse 74   Temp 98.6  F (37  C) (Oral)   Resp 20   Ht 4' 11\" (1.499 m)   Wt 130 lb (59 kg)   SpO2 97%   BMI 26.26 kg/m       No results found for this or any previous visit (from the past 24 hour(s)).     GEN-alert, appropriate, in no apparent " distress   Musculoskeletal -no knee effusions.  Fair range of motion bilaterally.   CV-regular rate and rhythm with no murmur   RESP-lungs clear to auscultation   ABDOMINAL-soft, nontender, no palpable masses organomegaly   EXTREM-warm with no edema   SKIN-no ulcers or vesicles   Psychiatric- appearance well-groomed, speech of normal fluency and rate, affect bright, mood not depressed, thought content negative for suicidal or homicidal ideation.    Jose Snyder

## 2021-06-03 VITALS
WEIGHT: 130 LBS | HEART RATE: 74 BPM | OXYGEN SATURATION: 97 % | DIASTOLIC BLOOD PRESSURE: 66 MMHG | SYSTOLIC BLOOD PRESSURE: 130 MMHG | HEIGHT: 60 IN | TEMPERATURE: 98.6 F | RESPIRATION RATE: 20 BRPM | BODY MASS INDEX: 25.52 KG/M2

## 2021-06-03 VITALS — BODY MASS INDEX: 24.35 KG/M2 | WEIGHT: 124 LBS | HEIGHT: 60 IN

## 2021-06-03 VITALS — BODY MASS INDEX: 24.54 KG/M2 | WEIGHT: 125 LBS | HEIGHT: 60 IN

## 2021-06-04 VITALS
RESPIRATION RATE: 16 BRPM | SYSTOLIC BLOOD PRESSURE: 140 MMHG | DIASTOLIC BLOOD PRESSURE: 70 MMHG | HEART RATE: 75 BPM | OXYGEN SATURATION: 96 % | WEIGHT: 134 LBS | BODY MASS INDEX: 26.31 KG/M2 | HEIGHT: 60 IN | TEMPERATURE: 98.8 F

## 2021-06-04 VITALS
BODY MASS INDEX: 25.97 KG/M2 | TEMPERATURE: 98.1 F | SYSTOLIC BLOOD PRESSURE: 126 MMHG | HEIGHT: 60 IN | DIASTOLIC BLOOD PRESSURE: 64 MMHG | OXYGEN SATURATION: 95 % | RESPIRATION RATE: 20 BRPM | WEIGHT: 132.25 LBS | HEART RATE: 70 BPM

## 2021-06-05 VITALS
HEIGHT: 60 IN | DIASTOLIC BLOOD PRESSURE: 68 MMHG | OXYGEN SATURATION: 98 % | WEIGHT: 132 LBS | SYSTOLIC BLOOD PRESSURE: 110 MMHG | RESPIRATION RATE: 20 BRPM | TEMPERATURE: 97.9 F | HEART RATE: 75 BPM | BODY MASS INDEX: 25.91 KG/M2

## 2021-06-05 VITALS
BODY MASS INDEX: 25.13 KG/M2 | DIASTOLIC BLOOD PRESSURE: 60 MMHG | RESPIRATION RATE: 20 BRPM | HEIGHT: 60 IN | WEIGHT: 128 LBS | HEART RATE: 71 BPM | TEMPERATURE: 98.3 F | SYSTOLIC BLOOD PRESSURE: 110 MMHG | OXYGEN SATURATION: 97 %

## 2021-06-05 NOTE — PROGRESS NOTES
Assessment and Plan:     1. Routine general medical examination at a health care facility  Age-appropriate counseling and anticipatory guidance done with help of a professional .  Reviewed colon cancer screening options with the patient, he is refusing colon cancer screening.    2. Essential hypertension  Borderline control.  Given his history of stroke we decided to increase the dose of amlodipine as prescribed below.  Recheck with me in the clinic in 1 month, sooner if problems.  - Comprehensive Metabolic Panel  - amLODIPine (NORVASC) 5 MG tablet; Take 1 tablet (5 mg total) by mouth daily. Dose increase  Dispense: 90 tablet; Refill: 3    3. History of GI bleed - status post emergent laprotomy in [place] in 2005  Lifetime proton pump inhibitor.  - omeprazole (PRILOSEC) 20 MG capsule; Take 1 capsule (20 mg total) by mouth 2 (two) times a day before meals.  Dispense: 180 capsule; Refill: 3    5. Lacunar infarction (H)  Refill- atorvastatin (LIPITOR) 80 MG tablet; Take 1 tablet (80 mg total) by mouth daily.  Dispense: 90 tablet; Refill: 3    6. Hypercholesteremia  Refill  - atorvastatin (LIPITOR) 80 MG tablet; Take 1 tablet (80 mg total) by mouth daily.  Dispense: 90 tablet; Refill: 3  - LDL Cholesterol, Direct    7. Adjustment disorder with depressed mood  Stable.  Refill  - escitalopram oxalate (LEXAPRO) 5 MG tablet; Take 1 tablet (5 mg total) by mouth daily. For depression  Dispense: 90 tablet; Refill: 3    9. Dental disease  - Ambulatory referral to Dentistry     The patient's current medical problems were reviewed.    I have had an Advance Directives discussion with the patient.  The following health maintenance schedule was reviewed with the patient and provided in printed form in the after visit summary:   Health Maintenance   Topic Date Due     LIPID  01/01/1982     COLONOSCOPY  01/01/1997     MEDICARE ANNUAL WELLNESS VISIT  07/16/2016     ZOSTER VACCINES (1 of 2) 09/28/2016     FALL RISK  ASSESSMENT  04/29/2020     ADVANCE CARE PLANNING  11/20/2024     TD 18+ HE  08/03/2026     HEPATITIS C SCREENING  Completed     PNEUMOCOCCAL IMMUNIZATION 65+ LOW/MEDIUM RISK  Completed     INFLUENZA VACCINE RULE BASED  Completed        Subjective:   Chief Complaint: Candice Antonio is an 75 y.o. male here for an Annual Wellness visit.   HPI: 75-year-old male here for his annual wellness visit.  He does not have any other concerns.    Review of Systems: Patient is get some occasional sharp sudden chest pain, last for about an hour, sometimes at rest sometimes with exertion.  No associated shortness of breath.  No blood in the urine or blood in the stool.  No black tarry stools.  He does get some upper abdominal burning that is mild and occasional, has been mostly under control with his omeprazole.  He does have a history of a severe GI bleed in the past.  Please see above.  The rest of the review of systems are negative for all systems.    Patient Care Team:  Jose Snyder MD as PCP - General (Family Medicine)  [facility]  (Ophthalmology)  Jose Snyder MD as Assigned PCP     Patient Active Problem List   Diagnosis     Lacunar infarction (H)     Refugee health examination     Gastritis     Adjustment disorder with depressed mood     Right shoulder tendonitis     History of GI bleed - status post emergent laprotomy in [facility] in 2005     Hypertension     Osteoarthritis of knees, bilateral     SBO (small bowel obstruction) (H)     Low magnesium level     Hypoglycemia     Dental disease     No past medical history on file.   Past Surgical History:   Procedure Laterality Date     GASTROJEJUNOSTOMY  2014    [facility]      No family history on file.   Social History     Socioeconomic History     Marital status:      Spouse name: Not on file     Number of children: Not on file     Years of education: Not on file     Highest education level: Not on file   Occupational History     Not on file   Social Needs      Financial resource strain: Not on file     Food insecurity:     Worry: Not on file     Inability: Not on file     Transportation needs:     Medical: Not on file     Non-medical: Not on file   Tobacco Use     Smoking status: Current Every Day Smoker     Types: Pipe     Smokeless tobacco: Current User     Types: Chew     Tobacco comment: smokes pipe twice a day, chews betel nut   Substance and Sexual Activity     Alcohol use: No     Drug use: No     Sexual activity: Never     Partners: Female   Lifestyle     Physical activity:     Days per week: Not on file     Minutes per session: Not on file     Stress: Not on file   Relationships     Social connections:     Talks on phone: Not on file     Gets together: Not on file     Attends Church service: Not on file     Active member of club or organization: Not on file     Attends meetings of clubs or organizations: Not on file     Relationship status: Not on file     Intimate partner violence:     Fear of current or ex partner: Not on file     Emotionally abused: Not on file     Physically abused: Not on file     Forced sexual activity: Not on file   Other Topics Concern     Not on file   Social History Narrative     Not on file      Current Outpatient Medications   Medication Sig Dispense Refill     amLODIPine (NORVASC) 5 MG tablet Take 1 tablet (5 mg total) by mouth daily. Dose increase 90 tablet 3     atorvastatin (LIPITOR) 80 MG tablet Take 1 tablet (80 mg total) by mouth daily. 90 tablet 3     escitalopram oxalate (LEXAPRO) 5 MG tablet Take 1 tablet (5 mg total) by mouth daily. For depression 90 tablet 3     omeprazole (PRILOSEC) 20 MG capsule Take 1 capsule (20 mg total) by mouth 2 (two) times a day before meals. 180 capsule 3     acetaminophen (TYLENOL EXTRA STRENGTH) 500 MG tablet Take 1 tablet (500 mg total) by mouth every 6 (six) hours as needed for pain. 120 tablet 6     polyethylene glycol (MIRALAX) 17 gram packet Take 1 packet (17 g total) by mouth daily.  0  "    No current facility-administered medications for this visit.       Objective:   Vital Signs:   Visit Vitals  /70 (Patient Site: Right Arm)   Pulse 75   Temp 98.8  F (37.1  C) (Oral)   Resp 16   Ht 4' 10.75\" (1.492 m)   Wt 134 lb (60.8 kg)   SpO2 96%   BMI 27.30 kg/m         VisionScreening:  No exam data present     PHYSICAL EXAM  Gen - alert, orientated, NAD  Eyes - fundascopic exam limited by the undialated pupil but looks symmetric  ENT - very poor dentition with gum disease, TMs clear  Neck - supple, no palpable mass or lymphadenopathy  CV - RRR, no murmur  Resp - lungs CTA  Ab - soft, nontender, no palpable mass or organomegaly   - normal appearance to the external genetalia, normal testicular exam bilaterally, no hernia  Extrem - warm, no edema  Neuro - CN II-XII intact, strength, sensation, reflexes intact and symmetric  Skin - no rash, no atypical appearing lesions seen.       Assessment Results 2/3/2020   Activities of Daily Living 5-6 - Severe functional impairment   Instrumental Activities of Daily Living 5-6 - Severe functional impairment   Mini Cog Total Score 3   Some recent data might be hidden     A Mini-Cog score of 0-2 suggests the possibility of dementia, score of 3-5 suggests no dementia    Identified Health Risks:     The patient was provided with suggestions to help him develop a healthy physical lifestyle.   He is at risk for lack of exercise and has been provided with information to increase physical activity for the benefit of his well-being.  The patient reports that he has difficulty with activities of daily living. I have asked that the patient make a follow up appointment in 4 weeks where this issue will be further evaluated and addressed.  The patient reports that he has difficulty with instrumental activities of daily living.  He was provided with a list of local organizations that provide support services and advised to make a follow up appointment in 4 weeks to address " this further.   The patient was provided with written information regarding signs of hearing loss.  The patient was provided with suggestions to help him develop a healthy emotional lifestyle.   The patient s PHQ-9 score is consistent with moderate depression.  He was provided with information regarding depression and was advised to schedule a follow up appointment in 4 weeks to further address this issue.  He is at risk for falling and has been provided with information to reduce the risk of falling at home.

## 2021-06-06 NOTE — PROGRESS NOTES
ASSESMENT AND PLAN:  Diagnoses and all orders for this visit:    Essential hypertension  Well-controlled.  Tolerating the increased dose of amlodipine well which will be continued indefinitely.    Chronic rhinitis  Mild.  Discussed treatment options today with the patient and he prefers no treatment at this time.      Reviewed the risks and benefits of the treatment plan with the patient and/or caregiver and we discussed indications for routine and emergent follow-up.        SUBJECTIVE: 75-year-old male here for follow-up, last visit amlodipine dose was increased because of hypertension under less than ideal control in the setting of history of stroke.  He has not noticed any problems with the higher dose of amlodipine and his blood pressure has responded very nicely.  Patient reports that he has had some intermittent nasal congestion and runny nose and very mild cough.  There is been a seasonal component to this in the past, and his been bothering him the last few days with the weather change.  No shortness of breath.  No night sweats or hemoptysis or weight loss.    No past medical history on file.  Patient Active Problem List   Diagnosis     Lacunar infarction (H)     Refugee health examination     Gastritis     Adjustment disorder with depressed mood     Right shoulder tendonitis     History of GI bleed - status post emergent laprotomy in [facility] in 2005     Hypertension     Osteoarthritis of knees, bilateral     SBO (small bowel obstruction) (H)     Low magnesium level     Hypoglycemia     Dental disease     Current Outpatient Medications   Medication Sig Dispense Refill     acetaminophen (TYLENOL EXTRA STRENGTH) 500 MG tablet Take 1 tablet (500 mg total) by mouth every 6 (six) hours as needed for pain. 120 tablet 6     amLODIPine (NORVASC) 5 MG tablet Take 1 tablet (5 mg total) by mouth daily. Dose increase 90 tablet 3     atorvastatin (LIPITOR) 80 MG tablet Take 1 tablet (80 mg total) by mouth daily. 90  "tablet 3     escitalopram oxalate (LEXAPRO) 5 MG tablet Take 1 tablet (5 mg total) by mouth daily. For depression 90 tablet 3     omeprazole (PRILOSEC) 20 MG capsule Take 1 capsule (20 mg total) by mouth 2 (two) times a day before meals. 180 capsule 3     No current facility-administered medications for this visit.      Social History     Tobacco Use   Smoking Status Current Every Day Smoker     Types: Pipe   Smokeless Tobacco Current User     Types: Chew   Tobacco Comment    smokes pipe twice a day, chews betel nut       OBJECTICE: /64   Pulse 70   Temp 98.1  F (36.7  C) (Oral)   Resp 20   Ht 4' 10.75\" (1.492 m)   Wt 132 lb 4 oz (60 kg)   SpO2 95%   BMI 26.94 kg/m       No results found for this or any previous visit (from the past 24 hour(s)).     GEN-alert, appropriate, in no apparent distress   HEENT-oropharynx is clear, mucous membranes are moist   CV-regular rate and rhythm with no murmur   RESP-lungs clear to auscultation   EXTREM-warm, no ankle edema       Jose P Egan          "

## 2021-06-09 NOTE — PROGRESS NOTES
ASSESMENT AND PLAN:  Diagnoses and all orders for this visit:    Osteoarthritis of knees, bilateral  -     meloxicam (MOBIC) 15 MG tablet; Take 1 tablet (15 mg total) by mouth daily.  Dispense: 90 tablet; Refill: 3  Reviewed the risks and benefits of the medication.  If not getting adequate response, follow-up for consideration of steroid injection.    Hypertension with dizziness  Reviewed the results from his basic metabolic panel lab results which were done last month.  Essentially normal.  Considering his blood pressure reading today and his ongoing symptoms of dizziness, we are going to decrease his amlodipine dose as prescribed below.  We reviewed the risks and benefits of medication change and discussed indications for follow-up.  -     amLODIPine (NORVASC) 2.5 MG tablet; Take 1 tablet (2.5 mg total) by mouth daily.  Dispense: 90 tablet; Refill: 3          SUBJECTIVE: 72-year-old male who complains of bilateral knee pain.  Moderate in severity.  Worse in the morning or after he has been sitting for a longer period of time.  It improved slightly with mobility.  No associated swelling, no locking or giving out, pain stays localized to the knees and does not radiate.  He's had knee pain that waxes and wanes over the last several years but it does seem to be worse over this last 1 month slowly worsening.  Patient has a history of hypertension.  He has been taking amlodipine daily.  Over this last few months he's been getting more frequent intermittent lightheadedness.  No vertigo.  No near syncope.  No chest pain or shortness of breath.  No leg pain or swelling.    No past medical history on file.  Patient Active Problem List   Diagnosis     Lacunar infarction     Refugee health examination     Gastritis     Adjustment disorder with depressed mood     Right shoulder tendonitis     History of GI bleed - status post emergent laprotomy in [facility] in 2005     Hypertension     Osteoarthritis of knees, bilateral  "    Current Outpatient Prescriptions   Medication Sig Dispense Refill     amLODIPine (NORVASC) 2.5 MG tablet Take 1 tablet (2.5 mg total) by mouth daily. 90 tablet 3     aspirin 81 MG EC tablet Take 1 tablet (81 mg total) by mouth daily. 90 tablet 3     atorvastatin (LIPITOR) 80 MG tablet Take 1 tablet (80 mg total) by mouth bedtime. 90 tablet 3     escitalopram oxalate (LEXAPRO) 5 MG tablet Take 1 tablet (5 mg total) by mouth daily. 90 tablet 3     guaiFENesin ER (MUCINEX) 600 mg 12 hr tablet Take 1 tab bid  for 5  days and then bid prn for cough or nasal drainage. 20 tablet 2     meloxicam (MOBIC) 15 MG tablet Take 1 tablet (15 mg total) by mouth daily. 90 tablet 3     omeprazole (PRILOSEC) 20 MG capsule TAKE 1 CAPSULE (20 MG TOTAL) BY MOUTH 2 (TWO) TIMES A DAY FOR STOMACH 180 capsule 2     No current facility-administered medications for this visit.      History   Smoking Status     Current Every Day Smoker     Types: Pipe   Smokeless Tobacco     Current User     Types: Chew     Comment: smokes pipe twice a day, chews betel nut       OBJECTICE: /64 (Patient Site: Left Arm, Patient Position: Sitting, Cuff Size: Adult Regular)  Pulse 66  Temp 98.4  F (36.9  C) (Oral)   Resp 16  Ht 4' 11\" (1.499 m)  Wt 126 lb 12 oz (57.5 kg)  SpO2 97%  BMI 25.6 kg/m2     No results found for this or any previous visit (from the past 24 hour(s)).     GEN-alert, appropriate, in no apparent distress   CV-regular rate and rhythm with no murmur   RESP-lungs clear to auscultation   ABDOMINAL-soft and nontender to palpation with no palpable mass   EXTREM-warm with no edema and tenderness   SKIN-no vesicles overlying his areas of pain   Musculoskeletal-no effusions, good range of motion, some crepitus on flexion and extension.      Jose Snyder          "

## 2021-06-11 NOTE — PROGRESS NOTES
ASSESMENT AND PLAN:  Diagnoses and all orders for this visit:    Pain, dental, Dental disease  -     Ambulatory referral to Dentistry urgently.  In the meantime will use acetaminophen 500 mg every 4-6 hours as needed for pain control.  I do not want to use an NSAID because of his history of GI bleeding.  Our specialty scheduling team is helping the patient get an appointment urgently with a dentist.    History of GI bleed - status post emergent laprotomy in [place] in 2005  Table, no current signs of bleeding.-     omeprazole (PRILOSEC) 20 MG capsule; Take 1 capsule (20 mg total) by mouth 2 (two) times a day before meals.  Dispense: 180 capsule; Refill: 3    Adjustment disorder with depressed mood  Stable.  Counseling done today with the patient with the help of a professional .  Continue Lexapro.  -     escitalopram oxalate (LEXAPRO) 5 MG tablet; Take 1 tablet (5 mg total) by mouth daily. For depression  Dispense: 90 tablet; Refill: 3    Hypercholesteremia  Stable.-     atorvastatin (LIPITOR) 80 MG tablet; Take 1 tablet (80 mg total) by mouth daily.  Dispense: 90 tablet; Refill: 3    Hypertension  Well-controlled.  Stable.-     amLODIPine (NORVASC) 5 MG tablet; Take 1 tablet (5 mg total) by mouth daily. Dose increase  Dispense: 90 tablet; Refill: 3    History of lacunar infarction (H)  -     atorvastatin (LIPITOR) 80 MG tablet; Take 1 tablet (80 mg total) by mouth daily.  Dispense: 90 tablet; Refill: 3    Gastritis  -     omeprazole (PRILOSEC) 20 MG capsule; Take 1 capsule (20 mg total) by mouth 2 (two) times a day before meals.  Dispense: 180 capsule; Refill: 3    Immunization deficiency  -     Influenza,Quad,High Dose,PF 65 YR+      Reviewed the risks and benefits of the treatment plan with the patient and/or caregiver and we discussed indications for routine and emergent follow-up.        SUBJECTIVE: 75-year-old male initially scheduled for annual wellness visit but his last health maintenance and  physical visit was less than a year ago having occurred about 7 months ago.  He has a lot of issues to follow-up on and therefore we canceled the physical and are instead doing a follow-up visit on his chronic conditions and addressing his new concern about dental pain.  Patient reports a 2-week history of moderate to severe dental pain.  He has pain both in the upper incisor area and the lower incisor area.  Pain is worse when he chews or bites and he is noticed that the associated teeth are loose.  It is bothering him enough where he would be willing to go ahead with dental extractions to settle the problem.  He has not been having any fever or purulent drainage.  History of medical conditions and has been medical conditions.  He has been taking his chronic daily medications regularly and tolerates them well.  No blood in the stool, no vomiting, no abdominal pain.  His sleep has been good most nights and he feels his mood has been good he is able to laugh and enjoy things in his daily life.    No past medical history on file.  Patient Active Problem List   Diagnosis     Lacunar infarction (H)     Refugee health examination     Gastritis     Adjustment disorder with depressed mood     Right shoulder tendonitis     History of GI bleed - status post emergent laprotomy in [facility] in 2005     Hypertension     Osteoarthritis of knees, bilateral     SBO (small bowel obstruction) (H)     Low magnesium level     Hypoglycemia     Dental disease     Hypercholesteremia     Current Outpatient Medications   Medication Sig Dispense Refill     amLODIPine (NORVASC) 5 MG tablet Take 1 tablet (5 mg total) by mouth daily. Dose increase 90 tablet 3     atorvastatin (LIPITOR) 80 MG tablet Take 1 tablet (80 mg total) by mouth daily. 90 tablet 3     escitalopram oxalate (LEXAPRO) 5 MG tablet Take 1 tablet (5 mg total) by mouth daily. For depression 90 tablet 3     omeprazole (PRILOSEC) 20 MG capsule Take 1 capsule (20 mg total) by mouth  "2 (two) times a day before meals. 180 capsule 3     acetaminophen (TYLENOL EXTRA STRENGTH) 500 MG tablet Take 1 tablet (500 mg total) by mouth every 6 (six) hours as needed for pain. 120 tablet 6     No current facility-administered medications for this visit.      Social History     Tobacco Use   Smoking Status Current Every Day Smoker     Types: Pipe   Smokeless Tobacco Current User     Types: Chew   Tobacco Comment    smokes pipe twice a day, chews betel nut       OBJECTICE: /60 (Patient Site: Left Arm, Patient Position: Sitting)   Pulse 71   Temp 98.3  F (36.8  C) (Oral)   Resp 20   Ht 4' 10.75\" (1.492 m)   Wt 128 lb (58.1 kg)   SpO2 97%   BMI 26.07 kg/m       No results found for this or any previous visit (from the past 24 hour(s)).     GEN-alert, appropriate, in no apparent distress   HEENT-extremely poor dentition.  No abscess visible or palpable but he has very discolored decayed teeth, several of which are loose to palpation.  Neck is supple with no palpable mass or lymphadenopathy.   CV-regular rate and rhythm with no murmur   RESP-lungs clear to auscultation   ABDOMINAL-soft and nontender   EXTREM-warm with no pitting edema   SKIN-no ulcers or vesicles      Jose Snyder"

## 2021-06-15 PROBLEM — M17.0 OSTEOARTHRITIS OF KNEES, BILATERAL: Status: ACTIVE | Noted: 2017-03-21

## 2021-06-16 PROBLEM — E16.2 HYPOGLYCEMIA: Status: ACTIVE | Noted: 2019-02-23

## 2021-06-16 PROBLEM — K08.9 DENTAL DISEASE: Status: ACTIVE | Noted: 2020-02-03

## 2021-06-16 PROBLEM — K56.609 SBO (SMALL BOWEL OBSTRUCTION) (H): Status: ACTIVE | Noted: 2019-02-21

## 2021-06-16 PROBLEM — E78.00 HYPERCHOLESTEREMIA: Status: ACTIVE | Noted: 2020-09-15

## 2021-06-16 PROBLEM — R79.0 LOW MAGNESIUM LEVEL: Status: ACTIVE | Noted: 2019-02-23

## 2021-06-16 NOTE — PROGRESS NOTES
ASSESMENT AND PLAN:  Diagnoses and all orders for this visit:    Essential hypertension  Well-controlled, continue current treatment plan, due for labs as ordered below, medication review and counseling done with the patient with the help of a professional .  -     Basic Metabolic Panel    Adjustment disorder with depressed mood  Stable.  Counseling done today with the patient with the help of a professional .  Continue Lexapro daily.    History of GI bleed - status post emergent laprotomy in [place] in 2005  No signs of recurrence.  Counseling done on the importance of lifelong proton pump inhibitor, continue omeprazole twice daily.    Screening for tuberculosis  Counseling done with the patient with the help of a professional .  Filled out his forms for the adult  and will mail results of the TB screening when available and then the patient will take those results letter is to his adult  finalizing his clearance to participate.  -     QTF-Mycobacterium tuberculosis by QuantiFERON-TB Gold Plus        Reviewed the risks and benefits of the treatment plan with the patient and/or caregiver and we discussed indications for routine and emergent follow-up.    30 minutes spent on the date of the encounter doing chart review, patient visit and documentation and face to face counseling on above.     SUBJECTIVE: 76-year-old male here for follow-up on several chronic conditions and needs paperwork completed to participate in adult [facility]  program.  On tuberculosis review of systems, he has not been having any chest pain or shortness of breath or chronic cough or hemoptysis or night sweats or recurring fevers.  He has a history of GI bleed in the past that was severe.  No recent blood in the stool or black tarry stools.  He has been taking all of his medications as prescribed and doing well.    No past medical history on file.  Patient Active Problem List   Diagnosis      "Lacunar infarction (H)     Refugee health examination     Gastritis     Adjustment disorder with depressed mood     Right shoulder tendonitis     History of GI bleed - status post emergent laprotomy in [place] in 2005     Hypertension     Osteoarthritis of knees, bilateral     SBO (small bowel obstruction) (H)     Low magnesium level     Hypoglycemia     Dental disease     Hypercholesteremia     Current Outpatient Medications   Medication Sig Dispense Refill     amLODIPine (NORVASC) 5 MG tablet Take 1 tablet (5 mg total) by mouth daily. Dose increase 90 tablet 3     atorvastatin (LIPITOR) 80 MG tablet Take 1 tablet (80 mg total) by mouth daily. 90 tablet 3     escitalopram oxalate (LEXAPRO) 5 MG tablet Take 1 tablet (5 mg total) by mouth daily. For depression 90 tablet 3     omeprazole (PRILOSEC) 20 MG capsule Take 1 capsule (20 mg total) by mouth 2 (two) times a day before meals. 180 capsule 3     No current facility-administered medications for this visit.      Social History     Tobacco Use   Smoking Status Current Every Day Smoker     Types: Pipe   Smokeless Tobacco Current User     Types: Chew   Tobacco Comment    smokes pipe twice a day, chews betel nut       OBJECTICE: /68 (Patient Site: Left Arm)   Pulse 75   Temp 97.9  F (36.6  C) (Oral)   Resp 20   Ht 4' 10.75\" (1.492 m)   Wt 132 lb (59.9 kg)   SpO2 98%   BMI 26.89 kg/m       Recent Results (from the past 24 hour(s))   Basic Metabolic Panel    Collection Time: 03/15/21  3:55 PM   Result Value Ref Range    Sodium 138 136 - 145 mmol/L    Potassium 4.2 3.5 - 5.0 mmol/L    Chloride 106 98 - 107 mmol/L    CO2 21 (L) 22 - 31 mmol/L    Anion Gap, Calculation 11 5 - 18 mmol/L    Glucose 84 70 - 125 mg/dL    Calcium 8.9 8.5 - 10.5 mg/dL    BUN 10 8 - 28 mg/dL    Creatinine 0.88 0.70 - 1.30 mg/dL    GFR MDRD Af Amer >60 >60 mL/min/1.73m2    GFR MDRD Non Af Amer >60 >60 mL/min/1.73m2        GEN-alert, in no acute distress   CV-regular rate and " rhythm   RESP-lungs clear to auscultation   ABDOMINAL-soft and nontender to palpation with no palpable mass         Jose Snyder

## 2021-06-21 NOTE — LETTER
Letter by Jose Snyder MD at      Author: Jose Snyder MD Service: -- Author Type: --    Filed:  Encounter Date: 3/17/2021 Status: (Other)               [address]   [place] [address]   PHONE [phone]  [phone]     March 17, 2021  [facility]  [address]  [place] [address]    Dear [facility]:    Below are the results from your recent visit.  Your results are normal, including negative tuberculosis screening, you are cleared to participate in the adult day center activities.  Please share this letter with the adult .    Resulted Orders   Basic Metabolic Panel   Result Value Ref Range    Sodium 138 136 - 145 mmol/L    Potassium 4.2 3.5 - 5.0 mmol/L    Chloride 106 98 - 107 mmol/L    CO2 21 (L) 22 - 31 mmol/L    Anion Gap, Calculation 11 5 - 18 mmol/L    Glucose 84 70 - 125 mg/dL    Calcium 8.9 8.5 - 10.5 mg/dL    BUN 10 8 - 28 mg/dL    Creatinine 0.88 0.70 - 1.30 mg/dL    GFR MDRD Af Amer >60 >60 mL/min/1.73m2    GFR MDRD Non Af Amer >60 >60 mL/min/1.73m2    Narrative    Fasting Glucose reference range is 70-99 mg/dL per  American Diabetes Association (ADA) guidelines.   QTF-Mycobacterium tuberculosis by QuantiFERON-TB Gold Plus   Result Value Ref Range    QTF RESULT Negative Negative    QTF INTREPRETATION       No interferon-gamma response to M. tuberculosis antigens was detected.  Infecton with M. tuberculosis is unlikely.  A negative result alone does not exclude infection with M. tuberculosis    QTF NIL 0.17 IU/mL    QTF ANTIGEN TB1-NIL 0.09 IU/mL    QTF ANTIGEN TB2 - NIL 0.00 IU/mL    QTF MITOGEN-NIL 7.29 IU/mL         If you have any questions or concerns, please do not hesitate to call.    Sincerely,      Jose Snyder MD  March 17, 2021

## 2021-06-23 NOTE — TELEPHONE ENCOUNTER
RN cannot approve Refill Request    RN can NOT refill this medication med is not covered by policy/route to provider.    Brooks Edmonds, [facility] Triage/Med Refill 2/7/2019    Requested Prescriptions   Pending Prescriptions Disp Refills     meloxicam (MOBIC) 15 MG tablet [Pharmacy Med Name: MELOXICAM 15 MG TABS 15 TAB] 90 tablet 3     Sig: TAKE 1 TABLET (15 MG TOTAL) BY MOUTH DAILY FOR PAIN    There is no refill protocol information for this order

## 2021-06-24 NOTE — PROGRESS NOTES
Hospital discharge follow up call to pt, he was napping. No signed consent, no message left.  RN will attempt call back at another time.

## 2021-06-24 NOTE — TELEPHONE ENCOUNTER
FYI - Status Update  Who is Calling: Lizy Care Coordinator with [facility]  Update: Patient was admitted for a small bowel obstruction from 2/21-2/24. The patient has since returned home and is doing well. No other concerns at this time. Patient is scheduled for a hospital follow up appointment on 2/27/2019.  Okay to leave a detailed message?:  No return call needed

## 2021-06-24 NOTE — PROGRESS NOTES
TCM DISCHARGE FOLLOW UP CALL    Discharge Date:  2/24/2019  Reason for hospital stay (discharge diagnosis)::  SBO  Are you feeling better, the same or worse since your discharge?:  Patient is feeling better (Feeling better, eating porridge.  No N/V, or abdominal pain.  He's had one BM since home, taking Miralax once a day.  Not passing gas, but burping a little.)  Do you feel like you have a plan in the event of a health emergency?: Yes (ER.  RN informed pt of 24/7 nurse triage)    As part of your discharge plan, were  home care services ordered for you?: No    Did you receive any new medications, or was there a change to your medications?: Yes    Are you taking those medications, or do you have any established regiment?:  RN reviewed discharge medications w/pt.  Pt states he is taking Miralax once a day, as prescribed, and still taking meloxicam.  RN advised pt discuss meloxicam w/PCP at appt today, as medication was discontinued on d/c.  Pt states he is bringing all of his medication bottles, including meloxicam, to his appt today and will discuss w/PCP if ok to continue taking it.  Do you have any follow up visits scheduled with your PCP or Specialist?:  Yes, with PCP (Dr. Mac today)  (RN) Is PCP appt scheduled soon enough (within 14 days of discharge date)?: Yes        Tova Russ RN Care Manager, Population Health

## 2021-06-24 NOTE — TELEPHONE ENCOUNTER
Just JOANNA for PCP.  INF is with Dr. Mac on 2/27/19.  Thanks.     This can wait until PCP returns in 2 weeks. Thanks.

## 2021-06-24 NOTE — PROGRESS NOTES
Writer called and spoke to patient's daughter post hospitalization for SBO.   Daughter states patient has no concerns or needs at this time. States he was discharged yesterday and resting at this time.   Daughter is aware of f/u appt post hospitalization with Dr. Mac on 2/27/19 at 11:20am.   States will let PCP know if they have any needs in the future.

## 2021-06-24 NOTE — PROGRESS NOTES
Assessment: /    Plan:    1. SBO (small bowel obstruction) (H)     2. Screen for colon cancer  Ambulatory referral for Colonoscopy       Recheck if any problem.    He agrees to do colonoscopy for colon cancer screening.    I reconciled all of his medications.    Patient was seen with professional Tiffanie , Jacques Parker.      Subjective:    HPI:  Candice Antonio is a 74-year-old male presenting for follow-up of hospitalization.  He was at [facility] of February 21 - February 24 due to small bowel obstruction.  It was presumed due to previous abdominal surgery.  He has been feeling better.  He had a bowel movement this morning.  He ate rice soup.  BMP and magnesium were normal on February 24.  Abdominal x-ray February 22 demonstrated improvement.  EKG February 21 was normal.      Review of Systems: No fever or nausea.      Current Outpatient Medications   Medication Sig Dispense Refill     amLODIPine (NORVASC) 2.5 MG tablet TAKE 1 TABLET (2.5 MG TOTAL) BY MOUTH DAILY FOR BLOOD PRESSURE 90 tablet 3     ASPIR-LOW 81 mg EC tablet TAKE 1 PILL BY MOUTH EVERYDAY 90 tablet 3     atorvastatin (LIPITOR) 80 MG tablet TAKE 1 TABLET (80 MG TOTAL) BY MOUTH BEDTIME FOR CHOLESTEROL 90 tablet 3     escitalopram oxalate (LEXAPRO) 5 MG tablet TAKE 1 TABLET (5 MG TOTAL) BY MOUTH DAILY FOR DEPRESSION 90 tablet 3     omeprazole (PRILOSEC) 20 MG capsule TAKE 1 CAPSULE (20 MG TOTAL) BY MOUTH 2 (TWO) TIMES A DAY FOR STOMACH 180 capsule 3     polyethylene glycol (MIRALAX) 17 gram packet Take 1 packet (17 g total) by mouth daily.  0     No current facility-administered medications for this visit.          Objective:    Vitals:    02/27/19 1132 02/27/19 1136   BP: 142/80 134/80   Pulse: 66    Resp: 20    Temp: 98.2  F (36.8  C)    TempSrc: Oral    SpO2: 98%    Weight: 122 lb 12 oz (55.7 kg)    Height: 5' (1.524 m)        Gen:  NAD, VSS  Lungs:  normal  Heart:  normal  Abdomen:  No HSM, mass or tenderness        ADDITIONAL HISTORY  SUMMARIZED (2): Reviewed discharge summary by Dr. Hussein, and surgery progress notes.  DECISION TO OBTAIN EXTRA INFORMATION (1): None.   RADIOLOGY TESTS (1): Reviewed abdominal x-ray.  LABS (1): Reviewed magnesium and BMP.  MEDICINE TESTS (1): Reviewed EKG.  INDEPENDENT REVIEW (2 each): None.     Total Data Points: 5

## 2021-08-06 NOTE — PATIENT INSTRUCTIONS - HE
Patient Instructions by Jose Snyder MD at 2/3/2020  1:20 PM     Author: Jose Snyder MD Service: -- Author Type: Physician    Filed: 2/3/2020  2:33 PM Encounter Date: 2/3/2020 Status: Signed    : Jose Snyder MD (Physician)         Patient Education     Your Health Risk Assessment indicates you feel you are not in good physical health.    A healthy lifestyle helps keep the body fit and the mind alert. It helps protect you from disease, helps you fight disease, and helps prevent chronic disease (disease that doesn't go away) from getting worse. This is important as you get older and begin to notice twinges in muscles and joints and a decline in the strength and stamina you once took for granted. A healthy lifestyle includes good healthcare, good nutrition, weight control, recreation, and regular exercise. Avoid harmful substances and do what you can to keep safe. Another part of a healthy lifestyle is stay mentally active and socially involved.    Good healthcare     Have a wellness visit every year.     If you have new symptoms, let us know right away. Don't wait until the next checkup.     Take medicines exactly as prescribed and keep your medicines in a safe place. Tell us if your medicine causes problems.   Healthy diet and weight control     Eat 3 or 4 small, nutritious, low-fat, high-fiber meals a day. Include a variety of fruits, vegetables, and whole-grain foods.     Make sure you get enough calcium in your diet. Calcium, vitamin D, and exercise help prevent osteoporosis (bone thinning).     If you live alone, try eating with others when you can. That way you get a good meal and have company while you eat it.     Try to keep a healthy weight. If you eat more calories than your body uses for energy, it will be stored as fat and you will gain weight.     Recreation   Recreation is not limited to sports and team events. It includes any activity that provides relaxation, interest, enjoyment, and  exercise. Recreation provides an outlet for physical, mental, and social energy. It can give a sense of worth and achievement. It can help you stay healthy.       Patient Education     Exercise for a Healthier Heart  You may wonder how you can improve the health of your heart. If youre thinking about exercise, youre on the right track. You dont need to become an athlete, but you do need a certain amount of brisk exercise to help strengthen your heart. If you have been diagnosed with a heart condition, your doctor may recommend exercise to help stabilize your condition. To help make exercise a habit, choose safe, fun activities.       Be sure to check with your health care provider before starting an exercise program.    Why exercise?  Exercising regularly offers many healthy rewards. It can help you do all of the following:    Improve your blood cholesterol levels to help prevent further heart trouble    Lower your blood pressure to help prevent a stroke or heart attack    Control diabetes, or reduce your risk of getting this disease    Improve your heart and lung function    Reach and maintain a healthy weight    Make your muscles stronger and more limber so you can stay active    Prevent falls and fractures by slowing the loss of bone mass (osteoporosis)    Manage stress better  Exercise tips  Ease into your routine. Set small goals. Then build on them.  Exercise on most days. Aim for a total of 150 or more minutes of moderate to  vigorous intensity activity each week. Consider 40 minutes, 3 to 4 times a week. For best results, activity should last for 40 minutes on average. It is OK to work up to the 40 minute period over time. Examples of moderate-intensity activity is walking one mile in 15 minutes or 30 to 45 minutes of yard work.  Step up your daily activity level. Along with your exercise program, try being more active throughout the day. Walk instead of drive. Do more household tasks or yard work.  Choose  one or more activities you enjoy. Walking is one of the easiest things you can do. You can also try swimming, riding a bike, or taking an exercise class.  Stop exercising and call your doctor if you:    Have chest pain or feel dizzy or lightheaded    Feel burning, tightness, pressure, or heaviness in your chest, neck, shoulders, back, or arms    Have unusual shortness of breath    Have increased joint or muscle pain    Have palpitations or an irregular heartbeat      2242-8595 [employer]. [address] [place] [address]. All rights reserved. This information is not intended as a substitute for professional medical care. Always follow your healthcare professional's instructions.         Patient Education   Activities of Daily Living  Your Health Risk Assessment indicates you have difficulties with activities of daily living such as eating, getting dressed, grooming, bathing, walking, or using the toilet. Please make a follow up appointment for us to address this issue in more detail.     Patient Education   Instrumental Activities of Daily Living  Your Health Risk Assessment indicates you have difficulties with instrumental activities of daily living which include laundry, housekeeping, banking, shopping, using the telephone, food preparation, transportation, or taking your own medications. Please make a follow up appointment for us to address this issue in more detail.    [employer] has resources available on the following website: https://www.healthZoove.org/caregivers.html     Also, here is a local agency that provides help with meals and other assistance:   [facility] Line: 422.160.2608     Patient Education   Signs of Hearing Loss  Hearing loss is a problem shared by many people. In fact, it is one of the most common health conditions, particularly as people age. Most people over age 65 have some hearing loss, and by age 80, almost everyone does. Because hearing loss usually occurs  slowly over the years, you may not realize your hearing ability has gotten worse.       Have your hearing checked  Contact your [facility] care provider if you:    Have to strain to hear normal conversation.    Have to watch other peoples faces very carefully to follow what theyre saying.    Need to ask people to repeat what theyve said.    Often misunderstand what people are saying.    Turn the volume of the television or radio up so high that others complain.    Feel that people are mumbling when theyre talking to you.    Find that the effort to hear leaves you feeling tired and irritated.    Notice, when using the phone, that you hear better with 1 ear than the other.    7511-0592 [employer]. [address] [place] [address], [place] [address]. All rights reserved. This information is not intended as a substitute for professional medical care. Always follow your healthcare professional's instructions.         Patient Education   Your Health Risk Assessment indicates you feel you are not in good emotional health.    Recreation   Recreation is not limited to sports and team events. It includes any activity that provides relaxation, interest, enjoyment, and exercise. Recreation provides an outlet for physical, mental, and social energy. It can give a sense of worth and achievement. It can help you stay healthy.    Mental Exercise and Social Involvement  Mental and emotional health is as important as physical health. Keep in touch with friends and family. Stay as active as possible. Continue to learn and challenge yourself.   Things you can do to stay mentally active are:    Learn something new, like a foreign language or musical instrument.     Play SCRABBLE or do crossword puzzles. If you cannot find people to play these games with you at home, you can play them with others on your computer through the Internet.     Join a games club--anything from card games to chess or checkers or lawn bowling.     Start a new  hobby.     Go back to school.     Volunteer.     Read.     Keep up with world events.       Patient Education   Depression and Suicide in Older Adults  Nearly 2 million older Americans have some type of depression. Sadly, some of them even take their own lives. Yet depression among older adults is often ignored. Learn the warning signs. You may help spare a loved one needless pain. You may also save a life.       What Is Depression?  Depression is a mood disorder that affects the way you think and feel. The most common symptom is a feeling of deep sadness. People who are depressed also may seem tired and listless. And nothing seems to give them pleasure. Its normal to grieve or be sad sometimes. But sadness lessens or passes with time. Depression rarely goes away or improves on its own. Other symptoms of depression are:    Sleeping more or less than normal    Eating more or less than normal    Having headaches, stomachaches, or other pains that dont go away    Feeling nervous, empty, or worthless    Crying a great deal    Thinking or talking about suicide or death    Feeling confused or forgetful  What Causes It?  The causes of depression arent fully known. Certain chemicals in the brain play a role. Depression does run in families. And life stresses can also trigger depression in some people. That may be the case with older adults. They often face great burdens, such as the death of friends or a spouse. They may have failing health. And they are more likely to be alone, lonely, or poor.  How You Can Help  Often, depressed people may not want to ask for help. When they do, they may be ignored. Or, they may receive the wrong treatment. You can help by showing parents and older friends love and support. If they seem depressed, help them find the right treatment. Talk to your doctor. Or contact a local mental health center, social service agency, or hospital. With modern treatment, no one has to suffer from  depression.  Resources:    National Fisher of Mental Health  273.513.9603  www.nimh.nih.gov    National [place] on Mental Illness  872.324.5751  www.pricilla.org    [facility] [place]  396.667.5697  www.[facility].org    National Suicide Hotline  981.903.9365 (800-SUICIDE)      4059-9758 [employer]. [address] [place] [address]. All rights reserved. This information is not intended as a substitute for professional medical care. Always follow your healthcare professional's instructions.         Patient Education   Preventing Falls in the Home  As you get older, falls are more likely. Thats because your reaction time slows. Your muscles and joints may also get stiffer, making them less flexible. Illness, medications, and vision changes can also affect your balance. A fall could leave you unable to live on your own. To make your home safer, follow these tips:    Floors    Put nonskid pads under area rugs.    Remove throw rugs.    Replace worn floor coverings.    Tack carpets firmly to each step on carpeted stairs. Put nonskid strips on the edges of uncarpeted stairs.    Keep floors and stairs free of clutter and cords.    Arrange furniture so there are clear pathways.    Clean up any spills right away.    Bathrooms    Install grab bars in the tub or shower.    Apply nonskid strips or put a nonskid rubber mat in the tub or shower.    Sit on a bath chair to bathe.    Use bathmats with nonskid backing.    Lighting    Keep a flashlight in each room.    Put a nightlight along the pathway between the bedroom and the bathroom.    4658-9956 [employer]. [address] [place] [address]. All rights reserved. This information is not intended as a substitute for professional medical care. Always follow your healthcare professional's instructions.         Patient Education   Personalized Prevention Plan  You are due for the preventive services outlined below.  Your care team is  available to assist you in scheduling these services.  If you have already completed any of these items, please share that information with your care team to update in your medical record.  Health Maintenance   Topic Date Due   ? LIPID  01/01/1982   ? COLONOSCOPY  01/01/1997   ? MEDICARE ANNUAL WELLNESS VISIT  07/16/2016   ? ZOSTER VACCINES (1 of 2) 09/28/2016   ? FALL RISK ASSESSMENT  04/29/2020   ? ADVANCE CARE PLANNING  11/20/2024   ? TD 18+ HE  08/03/2026   ? HEPATITIS C SCREENING  Completed   ? PNEUMOCOCCAL IMMUNIZATION 65+ LOW/MEDIUM RISK  Completed   ? INFLUENZA VACCINE RULE BASED  Completed

## 2021-09-20 ENCOUNTER — OFFICE VISIT (OUTPATIENT)
Dept: FAMILY MEDICINE | Facility: CLINIC | Age: 76
End: 2021-09-20
Payer: COMMERCIAL

## 2021-09-20 VITALS
HEIGHT: 60 IN | WEIGHT: 125 LBS | BODY MASS INDEX: 24.54 KG/M2 | SYSTOLIC BLOOD PRESSURE: 130 MMHG | DIASTOLIC BLOOD PRESSURE: 68 MMHG | TEMPERATURE: 98.6 F | HEART RATE: 74 BPM | OXYGEN SATURATION: 97 % | RESPIRATION RATE: 16 BRPM

## 2021-09-20 DIAGNOSIS — E78.00 HYPERCHOLESTEREMIA: ICD-10-CM

## 2021-09-20 DIAGNOSIS — I10 HYPERTENSION, UNSPECIFIED TYPE: Primary | ICD-10-CM

## 2021-09-20 DIAGNOSIS — Z87.19 HISTORY OF GI BLEED: ICD-10-CM

## 2021-09-20 DIAGNOSIS — I63.81 LACUNAR INFARCTION (H): ICD-10-CM

## 2021-09-20 DIAGNOSIS — Z23 NEED FOR IMMUNIZATION AGAINST INFLUENZA: ICD-10-CM

## 2021-09-20 DIAGNOSIS — F43.21 ADJUSTMENT DISORDER WITH DEPRESSED MOOD: ICD-10-CM

## 2021-09-20 PROCEDURE — 90471 IMMUNIZATION ADMIN: CPT | Performed by: FAMILY MEDICINE

## 2021-09-20 PROCEDURE — 90662 IIV NO PRSV INCREASED AG IM: CPT | Performed by: FAMILY MEDICINE

## 2021-09-20 PROCEDURE — 99213 OFFICE O/P EST LOW 20 MIN: CPT | Mod: 25 | Performed by: FAMILY MEDICINE

## 2021-09-20 RX ORDER — ESCITALOPRAM OXALATE 5 MG/1
5 TABLET ORAL DAILY
Qty: 90 TABLET | Refills: 3 | Status: SHIPPED | OUTPATIENT
Start: 2021-09-20 | End: 2022-10-25

## 2021-09-20 RX ORDER — AMLODIPINE BESYLATE 5 MG/1
5 TABLET ORAL DAILY
Qty: 90 TABLET | Refills: 3 | Status: SHIPPED | OUTPATIENT
Start: 2021-09-20 | End: 2022-10-25

## 2021-09-20 RX ORDER — ATORVASTATIN CALCIUM 80 MG/1
80 TABLET, FILM COATED ORAL DAILY
Qty: 90 TABLET | Refills: 3 | Status: SHIPPED | OUTPATIENT
Start: 2021-09-20 | End: 2022-10-25

## 2021-09-20 ASSESSMENT — MIFFLIN-ST. JEOR: SCORE: 1134.66

## 2021-09-20 NOTE — PROGRESS NOTES
ASSESMENT AND PLAN:  Diagnoses and all orders for this visit:  Hypertension, unspecified type  -     amLODIPine (NORVASC) 5 MG tablet; Take 1 tablet (5 mg) by mouth daily  Hypercholesteremia  -     atorvastatin (LIPITOR) 80 MG tablet; Take 1 tablet (80 mg) by mouth daily  History of GI bleed - status post emergent laprotomy in [facility] in 2005  -     omeprazole (PRILOSEC) 20 MG DR capsule; Take 1 capsule (20 mg) by mouth 2 times daily (before meals)  Adjustment disorder with depressed mood  -     escitalopram (LEXAPRO) 5 MG tablet; Take 1 tablet (5 mg) by mouth daily  Need for immunization against influenza  -     INFLUENZA, QUAD, HD, PF, 65+ (FLUZONE HD)    Follow-up with me in clinic in 2 months for recheck, labs at that time.  Medication review and counseling done with the patient and his family with the help of a professional .    Reviewed the risks and benefits of the treatment plan with the patient and/or caregiver and we discussed indications for routine and emergent follow-up.        SUBJECTIVE: 76-year-old male comes in today off all medication for the last month because he ran out of refills.  Since running out of his medication, has been getting some intermittent mild to moderate dizziness.  No chest pain or shortness of breath.  No abdominal pain or blood in the stool or black tarry stools.  No vomiting.    History reviewed. No pertinent past medical history.  Patient Active Problem List   Diagnosis     Lacunar infarction (H)     Refugee health examination     Gastritis     Adjustment disorder with depressed mood     Right shoulder tendonitis     History of GI bleed - status post emergent laprotomy in [facility] in 2005     Hypertension     Osteoarthritis of knees, bilateral     SBO (small bowel obstruction) (H)     Low magnesium level     Hypoglycemia     Dental disease     Hypercholesteremia     Current Outpatient Medications   Medication Sig Dispense Refill     amLODIPine (NORVASC) 5 MG  "tablet Take 1 tablet (5 mg) by mouth daily 90 tablet 3     atorvastatin (LIPITOR) 80 MG tablet Take 1 tablet (80 mg) by mouth daily 90 tablet 3     escitalopram (LEXAPRO) 5 MG tablet Take 1 tablet (5 mg) by mouth daily 90 tablet 3     omeprazole (PRILOSEC) 20 MG DR capsule Take 1 capsule (20 mg) by mouth 2 times daily (before meals) 180 capsule 3     History   Smoking Status     Current Every Day Smoker     Types: Pipe   Smokeless Tobacco     Current User     Types: Chew     Comment: smokes pipe twice a day, chews betel nut       OBJECTICE: /68 (BP Location: Left arm, Patient Position: Sitting, Cuff Size: Adult Regular)   Pulse 74   Temp 98.6  F (37  C) (Oral)   Resp 16   Ht 1.492 m (4' 10.75\")   Wt 56.7 kg (125 lb)   SpO2 97%   BMI 25.46 kg/m       No results found for this or any previous visit (from the past 24 hour(s)).     CV-regular rate and rhythm with no murmur   RESP-lungs clear to auscultation   EXTREM-warm with no edema     Jose Snyder MD   "

## 2021-11-22 ENCOUNTER — OFFICE VISIT (OUTPATIENT)
Dept: FAMILY MEDICINE | Facility: CLINIC | Age: 76
End: 2021-11-22
Payer: COMMERCIAL

## 2021-11-22 VITALS
BODY MASS INDEX: 24.15 KG/M2 | HEIGHT: 60 IN | HEART RATE: 69 BPM | OXYGEN SATURATION: 98 % | DIASTOLIC BLOOD PRESSURE: 62 MMHG | SYSTOLIC BLOOD PRESSURE: 130 MMHG | RESPIRATION RATE: 20 BRPM | WEIGHT: 123 LBS | TEMPERATURE: 97.9 F

## 2021-11-22 DIAGNOSIS — I10 HYPERTENSION, UNSPECIFIED TYPE: Primary | ICD-10-CM

## 2021-11-22 DIAGNOSIS — Z23 HIGH PRIORITY FOR 2019-NCOV VACCINE: ICD-10-CM

## 2021-11-22 DIAGNOSIS — E78.00 HYPERCHOLESTEREMIA: ICD-10-CM

## 2021-11-22 DIAGNOSIS — H10.13 ALLERGIC CONJUNCTIVITIS, BILATERAL: ICD-10-CM

## 2021-11-22 LAB
ALBUMIN SERPL-MCNC: 3.8 G/DL (ref 3.5–5)
ALP SERPL-CCNC: 92 U/L (ref 45–120)
ALT SERPL W P-5'-P-CCNC: 21 U/L (ref 0–45)
ANION GAP SERPL CALCULATED.3IONS-SCNC: 10 MMOL/L (ref 5–18)
AST SERPL W P-5'-P-CCNC: 27 U/L (ref 0–40)
BILIRUB SERPL-MCNC: 0.8 MG/DL (ref 0–1)
BUN SERPL-MCNC: 11 MG/DL (ref 8–28)
CALCIUM SERPL-MCNC: 9.6 MG/DL (ref 8.5–10.5)
CHLORIDE BLD-SCNC: 106 MMOL/L (ref 98–107)
CO2 SERPL-SCNC: 21 MMOL/L (ref 22–31)
CREAT SERPL-MCNC: 1.02 MG/DL (ref 0.7–1.3)
GFR SERPL CREATININE-BSD FRML MDRD: 72 ML/MIN/1.73M2
GLUCOSE BLD-MCNC: 96 MG/DL (ref 70–125)
LDLC SERPL CALC-MCNC: 135 MG/DL
POTASSIUM BLD-SCNC: 4 MMOL/L (ref 3.5–5)
PROT SERPL-MCNC: 7.9 G/DL (ref 6–8)
SODIUM SERPL-SCNC: 137 MMOL/L (ref 136–145)

## 2021-11-22 PROCEDURE — 0004A COVID-19,PF,PFIZER (12+ YRS): CPT | Performed by: FAMILY MEDICINE

## 2021-11-22 PROCEDURE — 80053 COMPREHEN METABOLIC PANEL: CPT | Performed by: FAMILY MEDICINE

## 2021-11-22 PROCEDURE — 83721 ASSAY OF BLOOD LIPOPROTEIN: CPT | Performed by: FAMILY MEDICINE

## 2021-11-22 PROCEDURE — 99214 OFFICE O/P EST MOD 30 MIN: CPT | Performed by: FAMILY MEDICINE

## 2021-11-22 PROCEDURE — 91300 COVID-19,PF,PFIZER (12+ YRS): CPT | Performed by: FAMILY MEDICINE

## 2021-11-22 PROCEDURE — 36415 COLL VENOUS BLD VENIPUNCTURE: CPT | Performed by: FAMILY MEDICINE

## 2021-11-22 ASSESSMENT — MIFFLIN-ST. JEOR: SCORE: 1125.58

## 2021-11-22 NOTE — PROGRESS NOTES
ASSESMENT AND PLAN:  Diagnoses and all orders for this visit:  Hypertension, unspecified type  Stable.  Continue current plan, due for labs.  -     Comprehensive metabolic panel (BMP + Alb, Alk Phos, ALT, AST, Total. Bili, TP); Future  Hypercholesteremia  Stable.  Continue current plan, due for labs.  -     LDL cholesterol direct; Future  -     Comprehensive metabolic panel (BMP + Alb, Alk Phos, ALT, AST, Total. Bili, TP); Future  Allergic conjunctivitis, bilateral  Counseled the patient on options with the help of a professional .  Will use eyedrops as prescribed below and if he is not getting good response over the next couple of weeks then will follow up either here in clinic or with eye doctor.  -     ketotifen (ZADITOR) 0.025 % ophthalmic solution; Place 1 drop into both eyes 2 times daily  High priority for 2019-nCoV vaccine  Immunization review and counseling done in detail with the patient.  -     COVID-19,PF,PFIZER (12+ Yrs PURPLE LABEL)      Reviewed the risks and benefits of the treatment plan with the patient and/or caregiver and we discussed indications for routine and emergent follow-up.        SUBJECTIVE: 76-year-old male has a history of hypertension hypercholesterolemia, due for follow-up and labs.  Patient reports no issues or problems with his current medications.  No chest pain or shortness of breath.  No ankle edema.  He does have a new concern about some mild redness and irritation and clear liquid tearing of both eyes.  Sometimes the tearing is enough that it causes the vision to be intermittently blurry.  Clears with wiping away the tears.  No eye pain.  No fevers.    No past medical history on file.  Patient Active Problem List   Diagnosis     Lacunar infarction (H)     Refugee health examination     Gastritis     Adjustment disorder with depressed mood     Right shoulder tendonitis     History of GI bleed - status post emergent laprotomy in [facility]note [facility] in 2005     Hypertension      "Osteoarthritis of knees, bilateral     SBO (small bowel obstruction) (H)     Low magnesium level     Hypoglycemia     Dental disease     Hypercholesteremia     Current Outpatient Medications   Medication Sig Dispense Refill     amLODIPine (NORVASC) 5 MG tablet Take 1 tablet (5 mg) by mouth daily 90 tablet 3     atorvastatin (LIPITOR) 80 MG tablet Take 1 tablet (80 mg) by mouth daily 90 tablet 3     escitalopram (LEXAPRO) 5 MG tablet Take 1 tablet (5 mg) by mouth daily 90 tablet 3     ketotifen (ZADITOR) 0.025 % ophthalmic solution Place 1 drop into both eyes 2 times daily 10 mL 4     omeprazole (PRILOSEC) 20 MG DR capsule Take 1 capsule (20 mg) by mouth 2 times daily (before meals) 180 capsule 3     History   Smoking Status     Current Every Day Smoker     Types: Pipe   Smokeless Tobacco     Current User     Types: Chew     Comment: smokes pipe twice a day, chews betel nut       OBJECTICE: /62 (BP Location: Right arm, Patient Position: Sitting)   Pulse 69   Temp 97.9  F (36.6  C) (Oral)   Resp 20   Ht 1.492 m (4' 10.75\")   Wt 55.8 kg (123 lb)   SpO2 98%   BMI 25.05 kg/m       No results found for this or any previous visit (from the past 24 hour(s)).     GEN-alert, appropriate, in no apparent distress   HEENT-no corneal opacities.  Some mild redness of the conjunctiva bilaterally.   CV-regular rate and rhythm with no murmur   RESP-lungs clear to auscultation   EXTREM-warm with no pitting edema of the ankles     Jose Snyder MD   "

## 2022-01-24 ENCOUNTER — PATIENT OUTREACH (OUTPATIENT)
Dept: GERIATRIC MEDICINE | Facility: CLINIC | Age: 77
End: 2022-01-24
Payer: COMMERCIAL

## 2022-01-24 NOTE — LETTER
January 24, 2022    CANDICE SAINI  [address]  [place] [address]    Dear  Candice,    Welcome to [facility] s [place] [facility] ([facility] health program. My name is Isabel Brady RN. I am your [facility] care coordinator. You are eligible for Care Coordination through [facility]3]+ Product.    Here is how Care Coordination works:    I will meet with you in person to determine your care coordination needs    We will develop a plan of care to meet your needs    We will create a service plan showing the services you will receive    We will talk about and coordinate any preventive care needs you have    I will call you soon to see how you are doing and determine what needs you may have. Our goal is to keep you as healthy and independent as possible.     Soon, you will receive a new [facility] member identification (ID) card from [facility]. When you receive it, please use this card along with your [place] Health [facility] Programs card and Prescription Drug Coverage Program card. When you receive, it please use this card where you get your health services. If you have Medicare, you will need to show your Medicare card when you get health services.    Being in the [place] [facility] ([facility]+) Care Coordination program is voluntary and offered to you at no cost. If you ever wish to stop being in the Care Coordination program or have questions, call me at [phone]. If you reach my voice mail, leave a message and your phone number. If you are hearing impaired, please call the [place] Relay at 707 or [phone] (hgiyjx-gp-geyxpv relay service).    Sincerely,        Isabel Brady RN    E-Mail:  [email or web address]  Phone: [phone]      [facility]    [id number] accepted   (12/2019)

## 2022-01-25 ENCOUNTER — PATIENT OUTREACH (OUTPATIENT)
Dept: GERIATRIC MEDICINE | Facility: CLINIC | Age: 77
End: 2022-01-25
Payer: COMMERCIAL

## 2022-01-25 NOTE — PROGRESS NOTES
[facility] Care Coordination Contact    Member became effective with  [facility] on 1/1/22 with [facility] MSC+.  Previous Health Plan: Blue Plus MSC+  Previous Care System: [facility]  Previous care coordinators name and number: NICKI Aceves Type: N/A  Last MMIS Entry: Date 04/29/2021, and Type  05 doc Chg   MMIS visit date (and type) if different from above: na  Services Listed in MMIS: None  UTF received: No: Requested on 1/24/22 through [employer]:  [email or web address]    New enrollment arrived today.    Martín Pinzon  Care Management Specialist  [facility]  [phone]

## 2022-01-25 NOTE — PROGRESS NOTES
[facility] Care Coordination Contact      [facility] Health Plan or Product Change    CC received notification that member's health plan or health plan product changed from Blue Plus MSC+ to [employer] MSC+ effective 1/1/2022.  CC contacted member and discussed change and face-to-face visit was offered. Member declined need for home visit. CC reviewed previous Health Risk Assessment/LTCC and POC with member and no changes noted.    Called member and introduced self as member s new CC. Confirmed with member that the welcome letter with writer's name and contact information has been received.  Reviewed LTCC/Health Risk Assessment (HRA) and POC with member. No changes noted.  Transitional HRA completed. Care Plan Summary updated and reflects current services.  Required referral authorization information communicated to CMS: Not applicable  Writer reviewed the following with member:  ER visits: Yes per member. He reports going to ED due to stomach pain/isssues but he cannot remember what month and does not know the name of the hospital he went to.  Hospitalizations: No  TCU stays: No  Significant health status changes: None  Falls/Injuries: No  ADL/IADL changes: No  Changes in services: No    Member reports no Advanced directive on file and not sure if he wants information. Care coordinator will ask and provide more information during annual assessment next month.    Follow-Up Plan: Member informed of future contact, plan to f/u with member with at next regularly scheduled contact.  Contact information shared with member and family, encouraged member to call with any questions or concerns.  Informed member that he is due for Annual HRA in February. He reports the due to COVID-19, he prefers care coordinator to do visit over the phone. Care coordinator informed him that care coordinator will have a Tiffanie  Tarik, call him to schedule assessment for February. Member states understanding.    Isabel Brady,  SOFÍA  [facility]  [phone]

## 2022-02-07 ENCOUNTER — PATIENT OUTREACH (OUTPATIENT)
Dept: GERIATRIC MEDICINE | Facility: CLINIC | Age: 77
End: 2022-02-07
Payer: COMMERCIAL

## 2022-02-07 ASSESSMENT — ACTIVITIES OF DAILY LIVING (ADL)
DEPENDENT_IADLS:: CLEANING;COOKING;LAUNDRY;SHOPPING;MEAL PREPARATION;MEDICATION MANAGEMENT;MONEY MANAGEMENT;TRANSPORTATION;INCONTINENCE

## 2022-02-07 ASSESSMENT — PATIENT HEALTH QUESTIONNAIRE - PHQ9: SUM OF ALL RESPONSES TO PHQ QUESTIONS 1-9: 0

## 2022-02-07 NOTE — PROGRESS NOTES
[facility] Care Coordination Contact    [facility] Home Visit Assessment     Home visit for Health Risk Assessment with Candice Pdae completed on February 7, 2022    Visit completed via phone due to COVID-19.    Type of residence:: Apartment  Current living arrangement:: I live in a private home with family     Assessment completed with:: Patient,Family    Current Care Plan  Member currently receiving the following home care services:     Member currently receiving the following community resources: PCA      Medication Review  Medication reconciliation completed in Epic: If no, please explain : Assessment completed via telephone due to COVID-190 restrictions. Unable to verify medications. Family unable to read pill bottles.  Medication set-up & administration: Family/informal caregiver sets up daily.  Family caregiver administers medications.  Medication Risk Assessment Medication (1 or more, place referral to MTM): N/A: No risk factors identified  MTM Referral Placed: No: No risk factors idenified    Mental/Behavioral Health   Depression Screening:      PHQ-9 Total Score: 0    Mental health DX:: Yes   Mental health DX how managed:: Medication    Falls Assessment:   Fallen 2 or more times in the past year?: No   Any fall with injury in the past year?: No    ADL/IADL Dependencies:   Dependent ADLs:: Bathing,Dressing,Grooming,Incontinence,Transfers,Toileting,Ambulation-walker,Eating  Dependent IADLs:: Cleaning,Cooking,Laundry,Shopping,Meal Preparation,Medication Management,Money Management,Transportation,Incontinence    [facility] Plan sponsored benefits: Shared information re: Silver Sneakers/gym memberships, ASA, Calcium +D.     PCA Assessment completed at visit: Yes Annual PCA assessment indicated 40 units per day of PCA. This is a decrease from the  previous assessment.     Elderly Waiver Eligibility: Yes, but member declines EW services; will not open to EW    Care Plan & Recommendations:   Member was  assessed for 10 hours/day of PCA services. This is a decreased from previous year's 10.5hours/day due to RP and member reporting he  does not have any socially inappropriate behaviors. Denies any hallucinations, disorientation, wandering, or depression symptoms.  RP reports that Pah only has minor forgetfulness and they are cautious of his safety so they lock his medications up in case he sees them, he might consume them. Medical records states that Pah struggles with his memory but no definite diagnosis.     See [facility] for detailed assessment information.    Follow-Up Plan: Member informed of future contact, plan to f/u with member with a 6 month telephone assessment.  Contact information shared with member and family, encouraged member to call with any questions or concerns at any time.    [facility] care continuum providers: Please refer to Health Care Home on the Epic Problem List to view this patient's [facility] Care Plan Summary.    Isabel Brady RN  [facility]  [phone]

## 2022-02-08 ENCOUNTER — PATIENT OUTREACH (OUTPATIENT)
Dept: GERIATRIC MEDICINE | Facility: CLINIC | Age: 77
End: 2022-02-08
Payer: COMMERCIAL

## 2022-02-08 NOTE — PROGRESS NOTES
[facility] Care Coordination Contact    Received a request to submit a DTR for the reduction of PCA hours. Documentation completed and faxed to the health plan. Care Coordinator aware.  EMMANUELLE Vincent  [facility]  [phone]

## 2022-02-17 ENCOUNTER — PATIENT OUTREACH (OUTPATIENT)
Dept: GERIATRIC MEDICINE | Facility: CLINIC | Age: 77
End: 2022-02-17
Payer: COMMERCIAL

## 2022-02-17 NOTE — LETTER
February 17, 2022    [facility] [address]VE[address]  [address]  [place] [address]        Dear [facility]:    At [facility], we are dedicated to improving your health and well-being. Enclosed is the Comprehensive Care Plan that we developed with you on 2/7/22. Please review the Care Plan carefully.    As a reminder, some of the things we discussed at your visit include:    Your physical and mental health    Ways to reduce falls    Health care needs you may have    Don t forget to contact your care coordinator if you:    Have been hospitalized or plan to be hospitalized     Have had a fall     Have experienced a change in physical health    Are experiencing emotional problems     If you do not agree with your Care Plan, have questions about it, or have experienced a change in your needs, please call me at [phone]. If you are hearing impaired, please call the [place] Relay at 149 or [phone] (dpjyhy-xq-ndzoqb relay service).    Sincerely,          Isabel Brady RN    E-Mail:  [email or web address]  Phone: [phone]      [facility]+[id number] [place] ([id number]     [id number] (11/18)

## 2022-02-18 NOTE — PROGRESS NOTES
[facility] Care Coordination Contact    Received after visit chart from care coordinator.  Completed following tasks: Mailed copy of care plan to client    [facility]:  Emailed completed PCA assessment to [facility].  Faxed copy of PCA assessment to PCA Agency and mailed copy to member.  Faxed MD Communication to PCP.     Mailed POC signature page and  PCA signature page, and KENNA to member to sign and return asap.    Mailed are Medication Disposal Form to member.    Dharmesh Mcintyre  [facility]  Case Management Specialist  [phone]

## 2022-05-25 ENCOUNTER — TELEPHONE (OUTPATIENT)
Dept: FAMILY MEDICINE | Facility: CLINIC | Age: 77
End: 2022-05-25

## 2022-05-25 NOTE — TELEPHONE ENCOUNTER
Patient is scheduled for a Citizenship appointment on 06/06 and patients daughter is requesting a Citizenship waiver appointment for patient.  Dr. Snyder next opening will not be until September.     Wondering if there are any exceptions, if someone else can perform the waiver [facility]    Patricia Solis  05/25/20222

## 2022-05-26 NOTE — TELEPHONE ENCOUNTER
I do not think it is possible to have any exceptions.  Please schedule patient with me for an appointment with August or September.  Thanks.

## 2022-07-14 ENCOUNTER — PATIENT OUTREACH (OUTPATIENT)
Dept: GERIATRIC MEDICINE | Facility: CLINIC | Age: 77
End: 2022-07-14

## 2022-07-14 NOTE — PROGRESS NOTES
[facility] Care Coordination Contact      [facility] Six-Month Telephone Assessment    6 month telephone assessment completed on 07/14/2022.    ER visits: No  Hospitalizations: No  TCU stays: No  Significant health status changes: None  Falls/Injuries: No  ADL/IADL changes: No  Changes in services: No    Caregiver Assessment follow up:  N/A    Goals: See POC in chart for goal progress documentation.      Will see member in 6 months for an annual health risk assessment.   Encouraged member to call CC with any questions or concerns in the meantime.     Isabel Brady RN  [facility]  [phone]

## 2022-08-17 ENCOUNTER — PATIENT OUTREACH (OUTPATIENT)
Dept: GERIATRIC MEDICINE | Facility: CLINIC | Age: 77
End: 2022-08-17

## 2022-08-17 NOTE — PROGRESS NOTES
CC updated program tasks and targets for [employer] launch.    Isabel Brady RN  [facility]  [phone]

## 2022-10-24 ENCOUNTER — TELEPHONE (OUTPATIENT)
Dept: FAMILY MEDICINE | Facility: CLINIC | Age: 77
End: 2022-10-24

## 2022-10-24 DIAGNOSIS — H10.13 ALLERGIC CONJUNCTIVITIS, BILATERAL: ICD-10-CM

## 2022-10-24 DIAGNOSIS — E78.00 HYPERCHOLESTEREMIA: ICD-10-CM

## 2022-10-24 DIAGNOSIS — Z76.0 ENCOUNTER FOR MEDICATION REFILL: Primary | ICD-10-CM

## 2022-10-24 DIAGNOSIS — F43.21 ADJUSTMENT DISORDER WITH DEPRESSED MOOD: ICD-10-CM

## 2022-10-24 DIAGNOSIS — I10 HYPERTENSION, UNSPECIFIED TYPE: ICD-10-CM

## 2022-10-24 DIAGNOSIS — Z87.19 HISTORY OF GI BLEED: ICD-10-CM

## 2022-10-24 NOTE — TELEPHONE ENCOUNTER
Medication Question or Refill    Contacts       Type Contact Phone/Fax    10/24/2022 02:54 PM CDT Phone (Incoming) Candice Antonio (Self) [phone] (H)          What medication are you calling about (include dose and sig)?: family is unsure    Controlled Substance Agreement on file:   CSA -- Patient Level:    CSA: None found at the patient level.       Who prescribed the medication?: Dr. Snyder    Do you need a refill? Yes: no longer receiving mailed medications    When did you use the medication last? -    Patient offered an appointment? Yes: scheduled in next available spot 12/06    Do you have any questions or concerns?  Yes: family wondering what they can do in the meantime. Can medications be bridged until appointment?    Preferred Pharmacy:   [facility] - [place], [place] - [address] [place] [address] [address]  [address]  [address]  [place] [address]  Phone: [phone] Fax: [phone]      Okay to leave a detailed message?: Yes at Cell number on file:    Telephone Information:   Mobile [phone]

## 2022-10-25 RX ORDER — AMLODIPINE BESYLATE 5 MG/1
5 TABLET ORAL DAILY
Qty: 90 TABLET | Refills: 3 | Status: SHIPPED | OUTPATIENT
Start: 2022-10-25 | End: 2023-09-11

## 2022-10-25 RX ORDER — ESCITALOPRAM OXALATE 5 MG/1
5 TABLET ORAL DAILY
Qty: 90 TABLET | Refills: 3 | Status: SHIPPED | OUTPATIENT
Start: 2022-10-25 | End: 2023-09-11

## 2022-10-25 RX ORDER — ATORVASTATIN CALCIUM 80 MG/1
80 TABLET, FILM COATED ORAL DAILY
Qty: 90 TABLET | Refills: 3 | Status: SHIPPED | OUTPATIENT
Start: 2022-10-25 | End: 2023-09-11

## 2022-10-25 NOTE — TELEPHONE ENCOUNTER
Author called with Tiffanie  to clarify which medicine should be refilled. Daughter states that patient is out of all refills. Need new refills sent to [facility] - [place], [place] - [facility] [address].     Medication refill queued and pended. Prescriber please review, sign, and send if appropriate. Thank you.

## 2022-12-06 ENCOUNTER — OFFICE VISIT (OUTPATIENT)
Dept: FAMILY MEDICINE | Facility: CLINIC | Age: 77
End: 2022-12-06
Payer: COMMERCIAL

## 2022-12-06 VITALS
SYSTOLIC BLOOD PRESSURE: 120 MMHG | OXYGEN SATURATION: 98 % | HEART RATE: 70 BPM | DIASTOLIC BLOOD PRESSURE: 60 MMHG | TEMPERATURE: 98.3 F | HEIGHT: 60 IN | WEIGHT: 125 LBS | BODY MASS INDEX: 24.54 KG/M2

## 2022-12-06 DIAGNOSIS — Z23 NEED FOR IMMUNIZATION AGAINST INFLUENZA: ICD-10-CM

## 2022-12-06 DIAGNOSIS — E78.00 HYPERCHOLESTEREMIA: ICD-10-CM

## 2022-12-06 DIAGNOSIS — R79.9 ABNORMAL FINDING OF BLOOD CHEMISTRY, UNSPECIFIED: ICD-10-CM

## 2022-12-06 DIAGNOSIS — H10.13 ALLERGIC CONJUNCTIVITIS, BILATERAL: ICD-10-CM

## 2022-12-06 DIAGNOSIS — Z23 NEED FOR VIRAL IMMUNIZATION: ICD-10-CM

## 2022-12-06 DIAGNOSIS — Z13.1 SCREENING FOR DIABETES MELLITUS: ICD-10-CM

## 2022-12-06 DIAGNOSIS — G47.00 INSOMNIA, UNSPECIFIED TYPE: Primary | ICD-10-CM

## 2022-12-06 LAB — HBA1C MFR BLD: 5.2 % (ref 0–5.6)

## 2022-12-06 PROCEDURE — 99214 OFFICE O/P EST MOD 30 MIN: CPT | Mod: 25 | Performed by: FAMILY MEDICINE

## 2022-12-06 PROCEDURE — 80061 LIPID PANEL: CPT | Performed by: FAMILY MEDICINE

## 2022-12-06 PROCEDURE — 36415 COLL VENOUS BLD VENIPUNCTURE: CPT | Performed by: FAMILY MEDICINE

## 2022-12-06 PROCEDURE — 83036 HEMOGLOBIN GLYCOSYLATED A1C: CPT | Performed by: FAMILY MEDICINE

## 2022-12-06 PROCEDURE — G0008 ADMIN INFLUENZA VIRUS VAC: HCPCS | Performed by: FAMILY MEDICINE

## 2022-12-06 PROCEDURE — 90662 IIV NO PRSV INCREASED AG IM: CPT | Performed by: FAMILY MEDICINE

## 2022-12-06 PROCEDURE — 91312 COVID-19 VACCINE BIVALENT BOOSTER 12+ (PFIZER): CPT | Performed by: FAMILY MEDICINE

## 2022-12-06 PROCEDURE — 0124A COVID-19 VACCINE BIVALENT BOOSTER 12+ (PFIZER): CPT | Performed by: FAMILY MEDICINE

## 2022-12-06 RX ORDER — TRAZODONE HYDROCHLORIDE 50 MG/1
25-50 TABLET, FILM COATED ORAL AT BEDTIME
Qty: 90 TABLET | Refills: 3 | Status: SHIPPED | OUTPATIENT
Start: 2022-12-06 | End: 2023-09-11

## 2022-12-06 NOTE — PROGRESS NOTES
ASSESMENT AND PLAN:  Diagnoses and all orders for this visit:  Insomnia, unspecified type  Discussed with the help of a professional  with the patient and his family.  We will try trazodone.  -     traZODone (DESYREL) 50 MG tablet; Take 0.5-1 tablets (25-50 mg) by mouth At Bedtime  Allergic conjunctivitis, bilateral  -     ketotifen (ZADITOR) 0.025 % ophthalmic solution; Place 1 drop into both eyes 2 times daily Or any covered similar per pharmacist  Hypercholesteremia  -     Lipid panel reflex to direct LDL Non-fasting; Future  Screening for diabetes mellitus/Abnormal finding of blood chemistry, unspecified  -     Hemoglobin A1c; Future  Need for immunization against influenza  -     INFLUENZA VACCINE 65+ (FLUZONE HD)  Need for viral immunization  -     COVID-19 VACCINE BIVALENT BOOSTER 12+ (PFIZER)      Reviewed the risks and benefits of the treatment plan with the patient and/or caregiver and we discussed indications for routine and emergent follow-up.        SUBJECTIVE: 77-year-old male here to follow-up on his medications.  He brings all of his medications with him today.  Taking them as prescribed and tolerating them well.  Reports that overall things are going well except for that he is not sleeping well.  His blood pressure is well controlled on his current medication.  Patient reports that he falls asleep and then often wakes up 2 to 3 hours later and then has difficulty getting back to sleep again.  He estimates that this is happening 5 nights out of the week.  Is been going on for several months.  Patient is also getting some itchy watery eyes bilaterally.  Not currently using eyedrops.    No past medical history on file.  Patient Active Problem List   Diagnosis     Lacunar infarction (H)     Refugee health examination     Gastritis     Adjustment disorder with depressed mood     Right shoulder tendonitis     History of GI bleed - status post emergent laprotomy in [facility] in 2005      "Hypertension     Osteoarthritis of knees, bilateral     SBO (small bowel obstruction) (H)     Low magnesium level     Hypoglycemia     Dental disease     Hypercholesteremia     Current Outpatient Medications   Medication Sig Dispense Refill     amLODIPine (NORVASC) 5 MG tablet Take 1 tablet (5 mg) by mouth daily 90 tablet 3     atorvastatin (LIPITOR) 80 MG tablet Take 1 tablet (80 mg) by mouth daily 90 tablet 3     escitalopram (LEXAPRO) 5 MG tablet Take 1 tablet (5 mg) by mouth daily 90 tablet 3     ketotifen (ZADITOR) 0.025 % ophthalmic solution Place 1 drop into both eyes 2 times daily Or any covered similar per pharmacist 10 mL 6     omeprazole (PRILOSEC) 20 MG DR capsule Take 1 capsule (20 mg) by mouth 2 times daily (before meals) 180 capsule 3     traZODone (DESYREL) 50 MG tablet Take 0.5-1 tablets (25-50 mg) by mouth At Bedtime 90 tablet 3     ketotifen (ZADITOR) 0.025 % ophthalmic solution Place 1 drop into both eyes 2 times daily 10 mL 4     History   Smoking Status     Unknown   Smokeless Tobacco     Never     Comment: chews betel nut several times a week       OBJECTICE: /60 (BP Location: Left arm)   Pulse 70   Temp 98.3  F (36.8  C) (Oral)   Ht 1.492 m (4' 10.75\")   Wt 56.7 kg (125 lb)   SpO2 98%   BMI 25.46 kg/m       Recent Results (from the past 24 hour(s))   Hemoglobin A1c    Collection Time: 12/06/22  3:49 PM   Result Value Ref Range    Hemoglobin A1C 5.2 0.0 - 5.6 %        CV-regular rate and rhythm   RESP-lungs clear   Eyes-mild conjunctival redness bilaterally.  No exudate.  No corneal opacity.   Psychiatric-appearance is well-groomed, speech of normal fluency and rate, mood is mildly depressed, affect is normal, thought content negative for suicidal or homicidal ideation, thought processing negative for paranoid or delusional thinking.    Jose Snyder MD     "

## 2022-12-07 LAB
CHOLEST SERPL-MCNC: 174 MG/DL
HDLC SERPL-MCNC: 64 MG/DL
LDLC SERPL CALC-MCNC: 84 MG/DL
NONHDLC SERPL-MCNC: 110 MG/DL
TRIGL SERPL-MCNC: 132 MG/DL

## 2022-12-19 ENCOUNTER — PATIENT OUTREACH (OUTPATIENT)
Dept: GERIATRIC MEDICINE | Facility: CLINIC | Age: 77
End: 2022-12-19

## 2022-12-19 NOTE — PROGRESS NOTES
Encounter opened due to Regulatory [employer] Update to close FVP Program.    Dharmesh Mcintyre  [facility]  Case Management Specialist  [phone]

## 2022-12-19 NOTE — PROGRESS NOTES
Encounter opened due to Regulatory [employer] Update to open FVP Program.    Dharmesh Mcintyre  [facility]  Case Management Specialist  [phone]

## 2023-01-02 ENCOUNTER — PATIENT OUTREACH (OUTPATIENT)
Dept: GERIATRIC MEDICINE | Facility: CLINIC | Age: 78
End: 2023-01-02

## 2023-01-02 NOTE — PROGRESS NOTES
[facility] Care Coordination Contact    Annual HRA scheduled on 1/3/22 at 11am.    Isabel Brady RN  [facility]  [phone]

## 2023-01-03 ENCOUNTER — PATIENT OUTREACH (OUTPATIENT)
Dept: GERIATRIC MEDICINE | Facility: CLINIC | Age: 78
End: 2023-01-03

## 2023-01-03 NOTE — Clinical Note
Annual HRA completed. No issues. Please see note for more details. Thank you.  Isabel Brady RN [facility] [phone]

## 2023-01-04 SDOH — ECONOMIC STABILITY: TRANSPORTATION INSECURITY
IN THE PAST 12 MONTHS, HAS THE LACK OF TRANSPORTATION KEPT YOU FROM MEDICAL APPOINTMENTS OR FROM GETTING MEDICATIONS?: NO

## 2023-01-04 SDOH — ECONOMIC STABILITY: FOOD INSECURITY: WITHIN THE PAST 12 MONTHS, THE FOOD YOU BOUGHT JUST DIDN'T LAST AND YOU DIDN'T HAVE MONEY TO GET MORE.: NEVER TRUE

## 2023-01-04 SDOH — ECONOMIC STABILITY: INCOME INSECURITY: IN THE LAST 12 MONTHS, WAS THERE A TIME WHEN YOU WERE NOT ABLE TO PAY THE MORTGAGE OR RENT ON TIME?: NO

## 2023-01-04 SDOH — ECONOMIC STABILITY: TRANSPORTATION INSECURITY
IN THE PAST 12 MONTHS, HAS LACK OF TRANSPORTATION KEPT YOU FROM MEETINGS, WORK, OR FROM GETTING THINGS NEEDED FOR DAILY LIVING?: NO

## 2023-01-04 SDOH — ECONOMIC STABILITY: FOOD INSECURITY: WITHIN THE PAST 12 MONTHS, YOU WORRIED THAT YOUR FOOD WOULD RUN OUT BEFORE YOU GOT MONEY TO BUY MORE.: NEVER TRUE

## 2023-01-04 ASSESSMENT — LIFESTYLE VARIABLES
AUDIT-C TOTAL SCORE: 0
HOW MANY STANDARD DRINKS CONTAINING ALCOHOL DO YOU HAVE ON A TYPICAL DAY: PATIENT DOES NOT DRINK
HOW OFTEN DO YOU HAVE A DRINK CONTAINING ALCOHOL: NEVER
SKIP TO QUESTIONS 9-10: 1
HOW OFTEN DO YOU HAVE SIX OR MORE DRINKS ON ONE OCCASION: NEVER

## 2023-01-04 ASSESSMENT — PATIENT HEALTH QUESTIONNAIRE - PHQ9: SUM OF ALL RESPONSES TO PHQ QUESTIONS 1-9: 5

## 2023-01-05 NOTE — PROGRESS NOTES
[facility] Care Coordination Contact    [facility] Home Visit Assessment     Home visit for Health Risk Assessment with Candice Pdae completed on January 3, 2023    Type of residence:: Apartment  Current living arrangement:: I live in a private home with family     Assessment completed with:: Patient, Children, Family    Current Care Plan  Member currently receiving the following home care services:     Member currently receiving the following community resources: PCA      Medication Review  Medication reconciliation completed in Epic: Yes  Medication set-up & administration: Family/informal caregiver sets up daily.  Family caregiver administers medications.  Medication Risk Assessment Medication (1 or more, place referral to MTM): N/A: No risk factors identified  MTM Referral Placed: No: No risk factors idenified    Mental/Behavioral Health   Depression Screening:      PHQ-9 Total Score: 5    Mental health DX:: Yes   Mental health DX how managed:: Medication    Falls Assessment:   Fallen 2 or more times in the past year?: No   Any fall with injury in the past year?: No    ADL/IADL Dependencies:   Dependent ADLs:: Ambulation-cane, Bathing, Dressing, Eating, Grooming, Positioning, Incontinence, Transfers, Wheelchair-with assist, Toileting  Dependent IADLs:: Cleaning, Cooking, Laundry, Shopping, Meal Preparation, Medication Management, Money Management, Transportation, Incontinence    [facility] sponsored benefits: Shared information re: Silver Sneakers/gym memberships, ASA, Calcium +D.    PCA Assessment completed at visit: Yes Annual PCA assessment indicated 40 units per day of PCA. This is the same as the previous assessment.     Elderly Waiver Eligibility: Yes, but member declines EW services; will not open to EW    Care Plan & Recommendations: member was assessed for 10 hours/day of PCA services to assist with all ADLs and IADLs. Family reports that someone is with member at all times and family  visit often so his depression is a lot better. Member and family decline wanting any EW services or needing any DME at this time.    See [facility] for detailed assessment information.    Follow-Up Plan: Member informed of future contact, plan to f/u with member with a 6 month telephone assessment.  Contact information shared with member and family, encouraged member to call with any questions or concerns at any time.    [place] care continuum providers: Please see Snapshot and Care Management Flowsheets for Specific details of care plan.    This CC note routed to PCP.    Isabel Brady RN  [facility]  [phone]

## 2023-01-13 ENCOUNTER — PATIENT OUTREACH (OUTPATIENT)
Dept: GERIATRIC MEDICINE | Facility: CLINIC | Age: 78
End: 2023-01-13

## 2023-01-13 NOTE — PROGRESS NOTES
[facility] Care Coordination Contact    Received after visit chart from care coordinator.  Completed following tasks: Mailed copy of care plan to client, Updated services in Database and Mailed [facility] Safe Medication Disposal     [facility]:  Emailed completed PCA assessment to [facility].  Faxed copy of PCA assessment to PCA Agency and mailed copy to member.  Faxed MD Communication to PCP.     Dharmesh Mcintyre  [facility]  Case Management Specialist  [phone]

## 2023-01-13 NOTE — LETTER
January 13, 2023    [facility]  [address]   [place] [address]        Dear Pah:    At [facility], we re dedicated to improving your health and wellness. Enclosed is the Care Plan developed with you on 1/3/23. Please review the Care Plan carefully.    As a reminder, during your visit we talked about:    Ways to manage your physical and mental health    Using health care to maintain and improve your health     Your preventive care needs     Remember to contact your care coordinator if you:    Are hospitalized, or plan to be hospitalized     Have a fall      Have a change in your physical or mental health    Need help finding support or services    If you have questions, or don t agree with your Care Plan, call me at [phone]. You can also call me if your needs change. TTY users, call the [place] [employer] at (995) or 1-705.334.1894 (zmwuyc-tm-tcepva relay service).    Sincerely,          Isabel Brady RN    E-Mail:  [email or web address]  Phone: [phone]      [facility]    [id number] accepted    [id number] (07/2022)

## 2023-07-25 ENCOUNTER — PATIENT OUTREACH (OUTPATIENT)
Dept: GERIATRIC MEDICINE | Facility: CLINIC | Age: 78
End: 2023-07-25

## 2023-07-25 NOTE — PROGRESS NOTES
[facility] Care Coordination Contact      [facility] Six-Month Telephone Assessment    6 month telephone assessment completed on 7/25/23.    ER visits: No  Hospitalizations: No  TCU stays: No  Significant health status changes: no  Falls/Injuries: No  ADL/IADL changes: No  Changes in services: No    Caregiver Assessment follow up:  n/a    Goals: See POC in chart for goal progress documentation.      Will see member in 6 months for an annual health risk assessment.   Encouraged member to call CC with any questions or concerns in the meantime.     Isabel Brady RN  [facility]  [phone]

## 2023-08-30 ENCOUNTER — TELEPHONE (OUTPATIENT)
Dept: FAMILY MEDICINE | Facility: CLINIC | Age: 78
End: 2023-08-30

## 2023-08-30 NOTE — TELEPHONE ENCOUNTER
Patient Quality Outreach    Patient is due for the following:   Physical Annual Wellness Visit    Next Steps:   Schedule a Annual Wellness Visit    Type of outreach:    Attempted to call patient no answer and no voice mail is set up.    Next Steps:  Reach out within 90 days via Phone.    Max number of attempts reached: Yes. Will try again in 90 days if patient still on fail list.    Questions for provider review:    None           Rajan Kay MA  Chart routed to Care Team.

## 2023-09-11 ENCOUNTER — ANCILLARY PROCEDURE (OUTPATIENT)
Dept: GENERAL RADIOLOGY | Facility: CLINIC | Age: 78
End: 2023-09-11
Attending: FAMILY MEDICINE
Payer: COMMERCIAL

## 2023-09-11 ENCOUNTER — OFFICE VISIT (OUTPATIENT)
Dept: FAMILY MEDICINE | Facility: CLINIC | Age: 78
End: 2023-09-11
Payer: COMMERCIAL

## 2023-09-11 VITALS
OXYGEN SATURATION: 97 % | SYSTOLIC BLOOD PRESSURE: 116 MMHG | HEIGHT: 60 IN | HEART RATE: 71 BPM | RESPIRATION RATE: 16 BRPM | WEIGHT: 127 LBS | DIASTOLIC BLOOD PRESSURE: 71 MMHG | TEMPERATURE: 98.1 F | BODY MASS INDEX: 24.94 KG/M2

## 2023-09-11 DIAGNOSIS — I10 HYPERTENSION, UNSPECIFIED TYPE: ICD-10-CM

## 2023-09-11 DIAGNOSIS — R05.9 COUGH, UNSPECIFIED TYPE: ICD-10-CM

## 2023-09-11 DIAGNOSIS — E78.00 HYPERCHOLESTEREMIA: ICD-10-CM

## 2023-09-11 DIAGNOSIS — F43.21 ADJUSTMENT DISORDER WITH DEPRESSED MOOD: ICD-10-CM

## 2023-09-11 DIAGNOSIS — R05.9 COUGH, UNSPECIFIED TYPE: Primary | ICD-10-CM

## 2023-09-11 DIAGNOSIS — Z87.19 HISTORY OF GI BLEED: ICD-10-CM

## 2023-09-11 DIAGNOSIS — G47.00 INSOMNIA, UNSPECIFIED TYPE: ICD-10-CM

## 2023-09-11 DIAGNOSIS — Z76.0 ENCOUNTER FOR MEDICATION REFILL: ICD-10-CM

## 2023-09-11 DIAGNOSIS — Z23 NEED FOR VACCINATION: ICD-10-CM

## 2023-09-11 PROCEDURE — 99214 OFFICE O/P EST MOD 30 MIN: CPT | Mod: 25 | Performed by: FAMILY MEDICINE

## 2023-09-11 PROCEDURE — G0008 ADMIN INFLUENZA VIRUS VAC: HCPCS | Performed by: FAMILY MEDICINE

## 2023-09-11 PROCEDURE — 71046 X-RAY EXAM CHEST 2 VIEWS: CPT | Mod: TC | Performed by: RADIOLOGY

## 2023-09-11 PROCEDURE — 90662 IIV NO PRSV INCREASED AG IM: CPT | Performed by: FAMILY MEDICINE

## 2023-09-11 RX ORDER — AMLODIPINE BESYLATE 5 MG/1
5 TABLET ORAL DAILY
Qty: 90 TABLET | Refills: 3 | Status: SHIPPED | OUTPATIENT
Start: 2023-09-11 | End: 2024-08-20

## 2023-09-11 RX ORDER — ESCITALOPRAM OXALATE 5 MG/1
5 TABLET ORAL DAILY
Qty: 90 TABLET | Refills: 3 | Status: SHIPPED | OUTPATIENT
Start: 2023-09-11 | End: 2024-08-20

## 2023-09-11 RX ORDER — ATORVASTATIN CALCIUM 80 MG/1
80 TABLET, FILM COATED ORAL DAILY
Qty: 90 TABLET | Refills: 3 | Status: SHIPPED | OUTPATIENT
Start: 2023-09-11 | End: 2024-08-20

## 2023-09-11 RX ORDER — TRAZODONE HYDROCHLORIDE 50 MG/1
25-50 TABLET, FILM COATED ORAL AT BEDTIME
Qty: 90 TABLET | Refills: 3 | Status: SHIPPED | OUTPATIENT
Start: 2023-09-11 | End: 2024-08-20

## 2023-09-11 NOTE — TELEPHONE ENCOUNTER
Patient Quality Outreach    Patient is due for the following:   Physical Annual Wellness Visit    Next Steps:   Schedule a Annual Wellness Visit    Type of outreach:    Phone, spoke to patient/parent. Scheduled appointment    Next Steps:  Reach out within 90 days via Phone.    Max number of attempts reached: No. Will try again in 90 days if patient still on fail list.    Questions for provider review:    None           Batsheva Rogers  Chart routed to Care Team.

## 2023-09-11 NOTE — PROGRESS NOTES
ASSESMENT AND PLAN:  Diagnoses and all orders for this visit:  Cough, unspecified type  Possibly secondary to reflux, counseled on the importance of adherence to the twice daily omeprazole.  We will also just to check chest x-ray restart trazodone.  Today.  -     XR Chest 2 Views; Future  Insomnia, unspecified type  Restart trazodone.  -     traZODone (DESYREL) 50 MG tablet; Take 0.5-1 tablets (25-50 mg) by mouth At Bedtime  Need for vaccination  -     INFLUENZA VACCINE 65+ (FLUZONE HD)  History of GI bleed - status post emergent laprotomy in [facility] in 2005  -     omeprazole (PRILOSEC) 20 MG DR capsule; Take 1 capsule (20 mg) by mouth 2 times daily (before meals)  Adjustment disorder with depressed mood  Stable, refill-     escitalopram (LEXAPRO) 5 MG tablet; Take 1 tablet (5 mg) by mouth daily  Hypercholesteremia  Stable, refill-     atorvastatin (LIPITOR) 80 MG tablet; Take 1 tablet (80 mg) by mouth daily  Hypertension, unspecified type  Stable, refill-     amLODIPine (NORVASC) 5 MG tablet; Take 1 tablet (5 mg) by mouth daily      Reviewed the risks and benefits of the treatment plan with the patient and/or caregiver and we discussed indications for routine and emergent follow-up.        SUBJECTIVE: 78-year-old male with a 1 month history of chronic pain.  Cough causes some mild pain across the anterior chest intermittently.  Cough has not been productive of blood or mucus.  No shortness of breath.  Patient has been taking all of his usual medications except he ran out of trazodone and did not patient family reports that he did not have side effects or problems with the medication.  It did help him sleep better and he was taking it and since he has been off of it his amount of time he is getting better sleep at night is short and not satisfactory.    No past medical history on file.  Patient Active Problem List   Diagnosis    Lacunar infarction (H)    Refugee health examination    Gastritis    Adjustment  "disorder with depressed mood    Right shoulder tendonitis    History of GI bleed - status post emergent laprotomy in [place] in 2005    Hypertension    Osteoarthritis of knees, bilateral    SBO (small bowel obstruction) (H)    Low magnesium level    Hypoglycemia    Dental disease    Hypercholesteremia     Current Outpatient Medications   Medication Sig Dispense Refill    amLODIPine (NORVASC) 5 MG tablet Take 1 tablet (5 mg) by mouth daily 90 tablet 3    atorvastatin (LIPITOR) 80 MG tablet Take 1 tablet (80 mg) by mouth daily 90 tablet 3    escitalopram (LEXAPRO) 5 MG tablet Take 1 tablet (5 mg) by mouth daily 90 tablet 3    omeprazole (PRILOSEC) 20 MG DR capsule Take 1 capsule (20 mg) by mouth 2 times daily (before meals) 180 capsule 3    traZODone (DESYREL) 50 MG tablet Take 0.5-1 tablets (25-50 mg) by mouth At Bedtime 90 tablet 3    ketotifen (ZADITOR) 0.025 % ophthalmic solution Place 1 drop into both eyes 2 times daily Or any covered similar per pharmacist 10 mL 6    ketotifen (ZADITOR) 0.025 % ophthalmic solution Place 1 drop into both eyes 2 times daily 10 mL 4     History   Smoking Status    Unknown   Smokeless Tobacco    Never       OBJECTICE: /71   Pulse 71   Temp 98.1  F (36.7  C) (Oral)   Resp 16   Ht 1.481 m (4' 10.3\")   Wt 57.6 kg (127 lb)   SpO2 97%   BMI 26.27 kg/m       No results found for this or any previous visit (from the past 24 hour(s)).     GEN-alert, appropriate, in no apparent distress   HEENT-neck is supple with no palpable mass or lymphadenopathy   CV-regular rate and rhythm   RESP-lungs clear   EXTREM-no pitting edema of the ankles     Jose Snyder MD   "

## 2023-09-12 ENCOUNTER — TELEPHONE (OUTPATIENT)
Dept: FAMILY MEDICINE | Facility: CLINIC | Age: 78
End: 2023-09-12

## 2023-09-12 NOTE — TELEPHONE ENCOUNTER
----- Message from Jose Snyder MD sent at 9/12/2023  1:58 PM CDT -----  Team - please call patient with results.  Normal chest x-ray.

## 2023-12-06 ENCOUNTER — PATIENT OUTREACH (OUTPATIENT)
Dept: GERIATRIC MEDICINE | Facility: CLINIC | Age: 78
End: 2023-12-06

## 2023-12-06 ASSESSMENT — PATIENT HEALTH QUESTIONNAIRE - PHQ9: SUM OF ALL RESPONSES TO PHQ QUESTIONS 1-9: 3

## 2023-12-06 ASSESSMENT — LIFESTYLE VARIABLES
SKIP TO QUESTIONS 9-10: 1
HOW OFTEN DO YOU HAVE SIX OR MORE DRINKS ON ONE OCCASION: NEVER
HOW MANY STANDARD DRINKS CONTAINING ALCOHOL DO YOU HAVE ON A TYPICAL DAY: PATIENT DOES NOT DRINK
HOW OFTEN DO YOU HAVE A DRINK CONTAINING ALCOHOL: NEVER
AUDIT-C TOTAL SCORE: 0

## 2023-12-06 NOTE — Clinical Note
Annual health risk assessment completed. No issues or concerns. See note for details.  Thanks Isabel Brady RN [facility] [phone]

## 2023-12-06 NOTE — PROGRESS NOTES
[facility] Care Coordination Contact    [facility] Home Visit Assessment     Home visit for Health Risk Assessment with Candice Pdae completed on December 4, 2023    Type of residence:: Apartment  Current living arrangement:: I live in a private home with family     Assessment completed with:: Patient, Children, Family    Current Care Plan  Member currently receiving the following home care services:     Member currently receiving the following community resources: PCA      Medication Review  Medication reconciliation completed in Epic: Yes  Medication set-up & administration: Family/informal caregiver sets up daily.  Family caregiver administers medications.  Medication Risk Assessment Medication (1 or more, place referral to MTM): Taking 1 or more high-risk medications for adults >65 years  MTM Referral Placed: No: member and family declined. Reports that he is only on a few medications and should be fine the way it is.    Mental/Behavioral Health   Depression Screening:      PHQ-9 Total Score: 3    Mental health DX:: Yes   Mental health DX how managed:: Medication    Falls Assessment:   Fallen 2 or more times in the past year?: No   Any fall with injury in the past year?: No    ADL/IADL Dependencies:   Dependent ADLs:: Ambulation-cane, Bathing, Dressing, Grooming, Positioning, Incontinence, Transfers, Wheelchair-with assist, Toileting  Dependent IADLs:: Cleaning, Cooking, Laundry, Shopping, Meal Preparation, Medication Management, Money Management, Transportation, Incontinence    [facility] sponsored benefits: Shared information re: Silver Sneakers/gym memberships, ASA, Calcium +D.    PCA Assessment completed at visit: Yes Annual PCA assessment indicated 9.5 hours per day of PCA. This is a decrease from the  previous assessment.     Elderly Waiver Eligibility: Yes, but member declines EW services; will not open to EW    Care Plan & Recommendations: member will continue with PCA services at  9.5hours/day. He would like bath chair. Care coordinator had sent request to [facility] to check if member is eligible to receive bath chair under [place]. Once [facility] responds, care coordinator will assist with getting bath chair.    See [facility] for detailed assessment information.    Follow-Up Plan: Member informed of future contact, plan to f/u with member with a 6 month telephone assessment.  Contact information shared with member and family, encouraged member to call with any questions or concerns at any time.    [place] care continuum providers: Please see Snapshot and Care Management Flowsheets for Specific details of care plan.    This CC note routed to PCP, Jose Snyder Mai, RN  [facility]  [phone]

## 2023-12-12 ENCOUNTER — PATIENT OUTREACH (OUTPATIENT)
Dept: GERIATRIC MEDICINE | Facility: CLINIC | Age: 78
End: 2023-12-12

## 2023-12-12 NOTE — LETTER
December 12, 2023    [facility]  [address]   [place] [address]        Dear Pah:    At [facility], we re dedicated to improving your health and wellness. Enclosed is the Care Plan developed with you on 12/04/2023. Please review the Care Plan carefully.    As a reminder, during your visit we talked about:  Ways to manage your physical and mental health  Using health care to maintain and improve your health   Your preventive care needs     Remember to contact your care coordinator if you:  Are hospitalized, or plan to be hospitalized   Have a fall    Have a change in your physical or mental health  Need help finding support or services    If you have questions, or don t agree with your Care Plan, call me at [phone]. You can also call me if your needs change. TTY users, call the [place] [facility] at (725) or 1-405.213.6754 (rrufzp-zn-emfyrq relay service).    Sincerely,          Isabel Brady RN    E-Mail:  [email or web address]  Phone: [phone]      [facility]    [id number] accepted    [id number] (07/2022)

## 2023-12-12 NOTE — PROGRESS NOTES
[facility] Care Coordination Contact    Received after visit chart from care coordinator.  Completed following tasks: Mailed copy of care plan/support plan to member, Mailed Safe Medication Disposal , and Updated services in Database  , Provider Signature - No POC Shared:  Member indicates that they do not want their POC shared with any EW providers.      [facility]:  Emailed required PCA documents to [facility].  Faxed copy of PCA assessment to PCA Agency and mailed copy to member.  Faxed MD Communication to PCP.     Vianca Thomason  Care Management Specialist  [facility]  [phone]

## 2023-12-28 ENCOUNTER — MEDICAL CORRESPONDENCE (OUTPATIENT)
Dept: HEALTH INFORMATION MANAGEMENT | Facility: CLINIC | Age: 78
End: 2023-12-28

## 2024-01-02 ENCOUNTER — TELEPHONE (OUTPATIENT)
Dept: FAMILY MEDICINE | Facility: CLINIC | Age: 79
End: 2024-01-02
Payer: COMMERCIAL

## 2024-01-02 NOTE — TELEPHONE ENCOUNTER
Patient Quality Outreach    Patient is due for the following:   Physical Annual Wellness Visit    Next Steps:   Schedule a Annual Wellness Visit    Type of outreach:    Phone, left message for patient/parent to call back.    Next Steps:  Reach out within 90 days via Phone.    Max number of attempts reached: No. Will try again in 90 days if patient still on fail list.    Questions for provider review:    None           Batsheva Rogers  Chart routed to Care Team.

## 2024-02-07 ENCOUNTER — OFFICE VISIT (OUTPATIENT)
Dept: FAMILY MEDICINE | Facility: CLINIC | Age: 79
End: 2024-02-07
Payer: COMMERCIAL

## 2024-02-07 VITALS
TEMPERATURE: 98.3 F | RESPIRATION RATE: 14 BRPM | OXYGEN SATURATION: 99 % | BODY MASS INDEX: 24.96 KG/M2 | HEART RATE: 78 BPM | DIASTOLIC BLOOD PRESSURE: 70 MMHG | WEIGHT: 127.12 LBS | SYSTOLIC BLOOD PRESSURE: 136 MMHG | HEIGHT: 60 IN

## 2024-02-07 DIAGNOSIS — H10.13 ALLERGIC CONJUNCTIVITIS, BILATERAL: ICD-10-CM

## 2024-02-07 DIAGNOSIS — I10 HYPERTENSION, UNSPECIFIED TYPE: ICD-10-CM

## 2024-02-07 DIAGNOSIS — Z76.0 ENCOUNTER FOR MEDICATION REFILL: ICD-10-CM

## 2024-02-07 DIAGNOSIS — R10.13 EPIGASTRIC PAIN: ICD-10-CM

## 2024-02-07 DIAGNOSIS — I63.81 LACUNAR INFARCTION (H): ICD-10-CM

## 2024-02-07 DIAGNOSIS — Z87.19 HX SBO: ICD-10-CM

## 2024-02-07 DIAGNOSIS — Z23 NEED FOR VACCINATION: ICD-10-CM

## 2024-02-07 DIAGNOSIS — Z87.19 HISTORY OF GI BLEED: ICD-10-CM

## 2024-02-07 DIAGNOSIS — H91.93 DECREASED HEARING OF BOTH EARS: Primary | ICD-10-CM

## 2024-02-07 DIAGNOSIS — E78.00 HYPERCHOLESTEREMIA: ICD-10-CM

## 2024-02-07 DIAGNOSIS — R42 VERTIGO: ICD-10-CM

## 2024-02-07 DIAGNOSIS — K29.70 GASTRITIS WITHOUT BLEEDING, UNSPECIFIED CHRONICITY, UNSPECIFIED GASTRITIS TYPE: ICD-10-CM

## 2024-02-07 LAB
BASOPHILS # BLD AUTO: 0 10E3/UL (ref 0–0.2)
BASOPHILS NFR BLD AUTO: 1 %
EOSINOPHIL # BLD AUTO: 0.2 10E3/UL (ref 0–0.7)
EOSINOPHIL NFR BLD AUTO: 2 %
ERYTHROCYTE [DISTWIDTH] IN BLOOD BY AUTOMATED COUNT: 12.9 % (ref 10–15)
HCT VFR BLD AUTO: 40.2 % (ref 40–53)
HGB BLD-MCNC: 13.7 G/DL (ref 13.3–17.7)
IMM GRANULOCYTES # BLD: 0 10E3/UL
IMM GRANULOCYTES NFR BLD: 0 %
LYMPHOCYTES # BLD AUTO: 2.1 10E3/UL (ref 0.8–5.3)
LYMPHOCYTES NFR BLD AUTO: 33 %
MCH RBC QN AUTO: 32.9 PG (ref 26.5–33)
MCHC RBC AUTO-ENTMCNC: 34.1 G/DL (ref 31.5–36.5)
MCV RBC AUTO: 97 FL (ref 78–100)
MONOCYTES # BLD AUTO: 0.5 10E3/UL (ref 0–1.3)
MONOCYTES NFR BLD AUTO: 9 %
NEUTROPHILS # BLD AUTO: 3.5 10E3/UL (ref 1.6–8.3)
NEUTROPHILS NFR BLD AUTO: 55 %
PLATELET # BLD AUTO: 177 10E3/UL (ref 150–450)
RBC # BLD AUTO: 4.16 10E6/UL (ref 4.4–5.9)
WBC # BLD AUTO: 6.3 10E3/UL (ref 4–11)

## 2024-02-07 PROCEDURE — 36415 COLL VENOUS BLD VENIPUNCTURE: CPT | Performed by: FAMILY MEDICINE

## 2024-02-07 PROCEDURE — 99214 OFFICE O/P EST MOD 30 MIN: CPT | Performed by: FAMILY MEDICINE

## 2024-02-07 PROCEDURE — 80053 COMPREHEN METABOLIC PANEL: CPT | Performed by: FAMILY MEDICINE

## 2024-02-07 PROCEDURE — 85025 COMPLETE CBC W/AUTO DIFF WBC: CPT | Performed by: FAMILY MEDICINE

## 2024-02-07 PROCEDURE — 82248 BILIRUBIN DIRECT: CPT | Performed by: FAMILY MEDICINE

## 2024-02-07 PROCEDURE — 83690 ASSAY OF LIPASE: CPT | Performed by: FAMILY MEDICINE

## 2024-02-07 PROCEDURE — 90480 ADMN SARSCOV2 VAC 1/ONLY CMP: CPT | Performed by: FAMILY MEDICINE

## 2024-02-07 PROCEDURE — 80061 LIPID PANEL: CPT | Performed by: FAMILY MEDICINE

## 2024-02-07 PROCEDURE — 91320 SARSCV2 VAC 30MCG TRS-SUC IM: CPT | Performed by: FAMILY MEDICINE

## 2024-02-07 RX ORDER — RESPIRATORY SYNCYTIAL VIRUS VACCINE 120MCG/0.5
0.5 KIT INTRAMUSCULAR ONCE
Qty: 1 EACH | Refills: 0 | Status: CANCELLED | OUTPATIENT
Start: 2024-02-07 | End: 2024-02-07

## 2024-02-07 RX ORDER — MECLIZINE HYDROCHLORIDE 25 MG/1
25 TABLET ORAL 3 TIMES DAILY PRN
Qty: 90 TABLET | Refills: 1 | Status: SHIPPED | OUTPATIENT
Start: 2024-02-07 | End: 2024-08-20

## 2024-02-07 NOTE — LETTER
February 9, 2024      [facility]  [address]   [place] [address]        Dear ,    We are writing to inform you of your test results.    The cholesterol continues to be high and suggest that you may not be taking the medication as prescribed.  Please be sure to take the cholesterol medication on a regular basis.     The rest of your blood tests are in the good range.   Please collect the stool sample return to clinic as soon as you are able.       Resulted Orders   Lipid panel reflex to direct LDL Non-fasting   Result Value Ref Range    Cholesterol 243 (H) <200 mg/dL    Triglycerides 207 (H) <150 mg/dL    Direct Measure HDL 45 >=40 mg/dL    LDL Cholesterol Calculated 157 (H) <=100 mg/dL    Non HDL Cholesterol 198 (H) <130 mg/dL    Patient Fasting > 8hrs? No     Narrative    Cholesterol  Desirable:  <200 mg/dL    Triglycerides  Normal:  Less than 150 mg/dL  Borderline High:  150-199 mg/dL  High:  200-499 mg/dL  Very High:  Greater than or equal to 500 mg/dL    Direct Measure HDL  Female:  Greater than or equal to 50 mg/dL   Male:  Greater than or equal to 40 mg/dL    LDL Cholesterol  Desirable:  <100mg/dL  Above Desirable:  100-129 mg/dL   Borderline High:  130-159 mg/dL   High:  160-189 mg/dL   Very High:  >= 190 mg/dL    Non HDL Cholesterol  Desirable:  130 mg/dL  Above Desirable:  130-159 mg/dL  Borderline High:  160-189 mg/dL  High:  190-219 mg/dL  Very High:  Greater than or equal to 220 mg/dL   Basic metabolic panel   Result Value Ref Range    Sodium 138 135 - 145 mmol/L      Comment:      Reference intervals for this test were updated on 09/26/2023 to more accurately reflect our healthy population. There may be differences in the flagging of prior results with similar values performed with this method. Interpretation of those prior results can be made in the context of the updated reference intervals.     Potassium 4.4 3.4 - 5.3 mmol/L    Chloride 104 98 - 107 mmol/L    Carbon Dioxide (CO2)  25 22 - 29 mmol/L    Anion Gap 9 7 - 15 mmol/L    Urea Nitrogen 10.2 8.0 - 23.0 mg/dL    Creatinine 1.11 0.67 - 1.17 mg/dL    GFR Estimate 68 >60 mL/min/1.73m2    Calcium 9.4 8.8 - 10.2 mg/dL    Glucose 92 70 - 99 mg/dL   Hepatic function panel   Result Value Ref Range    Protein Total 7.4 6.4 - 8.3 g/dL    Albumin 4.1 3.5 - 5.2 g/dL    Bilirubin Total 0.5 <=1.2 mg/dL    Alkaline Phosphatase 91 40 - 150 U/L      Comment:      Reference intervals for this test were updated on 11/14/2023 to more accurately reflect our healthy population. There may be differences in the flagging of prior results with similar values performed with this method. Interpretation of those prior results can be made in the context of the updated reference intervals.    AST 30 0 - 45 U/L      Comment:      Reference intervals for this test were updated on 6/12/2023 to more accurately reflect our healthy population. There may be differences in the flagging of prior results with similar values performed with this method. Interpretation of those prior results can be made in the context of the updated reference intervals.    ALT 19 0 - 70 U/L      Comment:      Reference intervals for this test were updated on 6/12/2023 to more accurately reflect our healthy population. There may be differences in the flagging of prior results with similar values performed with this method. Interpretation of those prior results can be made in the context of the updated reference intervals.      Bilirubin Direct <0.20 0.00 - 0.30 mg/dL   Lipase   Result Value Ref Range    Lipase 38 13 - 60 U/L   CBC with platelets and differential   Result Value Ref Range    WBC Count 6.3 4.0 - 11.0 10e3/uL    RBC Count 4.16 (L) 4.40 - 5.90 10e6/uL    Hemoglobin 13.7 13.3 - 17.7 g/dL    Hematocrit 40.2 40.0 - 53.0 %    MCV 97 78 - 100 fL    MCH 32.9 26.5 - 33.0 pg    MCHC 34.1 31.5 - 36.5 g/dL    RDW 12.9 10.0 - 15.0 %    Platelet Count 177 150 - 450 10e3/uL    % Neutrophils 55 %     % Lymphocytes 33 %    % Monocytes 9 %    % Eosinophils 2 %    % Basophils 1 %    % Immature Granulocytes 0 %    Absolute Neutrophils 3.5 1.6 - 8.3 10e3/uL    Absolute Lymphocytes 2.1 0.8 - 5.3 10e3/uL    Absolute Monocytes 0.5 0.0 - 1.3 10e3/uL    Absolute Eosinophils 0.2 0.0 - 0.7 10e3/uL    Absolute Basophils 0.0 0.0 - 0.2 10e3/uL    Absolute Immature Granulocytes 0.0 <=0.4 10e3/uL       If you have any questions or concerns, please call the clinic at the number listed above.       Sincerely,      Luiz Trejo MD

## 2024-02-07 NOTE — PATIENT INSTRUCTIONS
-Thank you for choosing the [facility].  -It was a pleasure to see you today.  -Please take a look at the information below for more specific details regarding the treatment plan and recommendations.  -In this after visit summary is a list of your medications and specific instructions.  Please review this carefully as there may be changes made to your medication list.  -If there are any particular questions or concerns, please feel free to reach out to Dr. Trejo.  -If any labs have been completed, we will reach out to you about results.  If the results are normal or not concerning, a letter or MyChart message will be sent to you.  If any follow-up is needed, either Dr. Trejo or the nurse will give you a call.  If you have not heard regarding results after 2 weeks, please reach out to the clinic.    Patient Instructions:    -Continue taking the omeprazole as prescribed (helps with the stomach).   -Collect the stool sample in the test kit and bring it back to clinic as soon as you are able.   -Get refills of the medications from your pharmacy. There are additional refills available for all the prescribed mediations.     -You were referred to the hearing specialist.  -If you do not hear from the specialist to schedule an appointment within a week's time from today, please call the [facility] and speak with the specialty  to help you schedule the appointment to see the specialist.  Depending on the specialist availability, it may be a number of weeks prior to your scheduled appointment.    -The Shingles/Shingrix vaccine is generally better covered by insurance companies if the vaccine is received at a pharmacy.  Please speak with your pharmacist regarding this vaccination as it is recommended for you.      Please seek immediate medical attention (go to the emergency room or urgent care) for the following reasons: worsening symptoms, or any concerning  changes.      --------------------------------------------------------------------------------------------------------------------    -We are always looking for ways to improve.  You may be selected to receive a survey regarding your visit today.  We encourage you to complete the survey and provide specific, constructive feedback to help us improve our processes.  Thank you for your time!  -Please review the contact information listed on the after visit summary and in the electronic chart.  Below is the phone number that we have on file.  If there are any changes that are needed to be made, please reach out to the clinic.  [phone] (home)

## 2024-02-07 NOTE — PROGRESS NOTES
OFFICE VISIT    Assessment/Plan:     Patient Instructions:    -Continue taking the omeprazole as prescribed (helps with the stomach).   -Collect the stool sample in the test kit and bring it back to clinic as soon as you are able.   -Get refills of the medications from your pharmacy. There are additional refills available for all the prescribed mediations.     -You were referred to the hearing specialist.  -If you do not hear from the specialist to schedule an appointment within a week's time from today, please call the [facility] and speak with the specialty  to help you schedule the appointment to see the specialist.  Depending on the specialist availability, it may be a number of weeks prior to your scheduled appointment.    -The Shingles/Shingrix vaccine is generally better covered by insurance companies if the vaccine is received at a pharmacy.  Please speak with your pharmacist regarding this vaccination as it is recommended for you.      Please seek immediate medical attention (go to the emergency room or urgent care) for the following reasons: worsening symptoms, or any concerning changes.      Pah was seen today for headache, abdominal pain, hearing problem and refill request.  Diagnoses and all orders for this visit:    Decreased hearing of both ears  Vertigo: long standing. Normal ear exam. Referral placed as below for further evaluation.  -     meclizine (ANTIVERT) 25 MG tablet; Take 1 tablet (25 mg) by mouth 3 times daily as needed for dizziness or nausea  -     Adult ENT  Referral; Future    Epigastric pain  Gastritis without bleeding, unspecified chronicity, unspecified gastritis type  History of GI bleed - status post emergent [facility] in 2005  Hx SBO: check labs as below. No pain or bloating noted today. No signs of bleeding. Further recommendations pending lab results.   -     Lipid panel reflex to direct LDL Non-fasting; Future  -     Basic metabolic panel;  Future  -     CBC with Platelets & Differential; Future  -     Hepatic function panel; Future  -     Lipase; Future  -     Helicobacter pylori Antigen Stool; Future    Hypercholesteremia  Hypertension, unspecified type  Lacunar infarction (H): recheck as below.   -     Lipid panel reflex to direct LDL Non-fasting; Future  -     Basic metabolic panel; Future  -     CBC with Platelets & Differential; Future    Allergic conjunctivitis, bilateral  Encounter for medication refill  -     ketotifen fumarate 0.035%, ketotifen 0.025%, (ZADITOR) 0.025 % ophthalmic solution; Place 1 drop into both eyes 2 times daily    Need for vaccination  -     COVID-19 12+ (2023-24) (PFIZER)        Return in about 2 months (around 4/7/2024) for Medicare Wellness Visit.    The diagnoses, treatment options, risk, benefits, and recommendations were reviewed with patient/guardian.  Questions were answered to patient's/guardian satisfaction.  Red flag signs were reviewed.  Patient/guardian is in agreement with above plan.      Subjective: 79 year old male with history of chronic clinical report, hypertension, hypercholesterolemia, gastritis, small bowel obstruction, history of GI bleed, osteoarthritis of knees (bilateral), dental disease who presents to clinic for the following complaints:   Patient presents with:  Headache  Abdominal Pain  Hearing Problem  Refill Request    Answers submitted by the patient for this visit:  General Questionnaire (Submitted on 2/7/2024)  Chief Complaint: Chronic problems general questions HPI Form  What is the reason for your visit today? : headache stomach pain  How many servings of fruits and vegetables do you eat daily?: 2-3  On average, how many sweetened beverages do you drink each day (Examples: soda, juice, sweet tea, etc.  Do NOT count diet or artificially sweetened beverages)?: 3  How many minutes a day do you exercise enough to make your heart beat faster?: 9 or less  How many days a week do you  exercise enough to make your heart beat faster?: 3 or less  How many days per week do you miss taking your medication?: 0    Hearing problem: he has been having some lightheadedness, dizziness, and problems with his hearing. Noted on both sides. This started for the last couple of months. He feels like the ears have pressure in them and there is ringing in the ears. Has some headaches associated with the hearing changes as well.  He has watery eyes on both sides. He can hear ringing in the ears. Denies any falls/injuries, pus, discharge, pain, nausea, vomiting, or other changes from baseline. He feels lightheaded all the time, though the symptoms come and goes. Position changes can make the dizziness worse. When he is going to get up, sit down, turn head, or lay down, he has worsening dizziness symptoms.     Abdominal pain: the stomach pains started almost a year ago. Patient has been prescribed omeprazole 20 mg twice daily before meals, which is helpful, though the symptoms never go away. H pylori test ordered before, though never completed.     Has watery eyes and blurry vision because of the watery eyes. He has been prescribed ketotifen previously and it was helpful for a little bit. Discussed allergic conjunctivitis and treatment options. Refill sent for patient.     HM due was reviewed with patient/parent.  Recommendations, risk, benefits were reviewed.  Accepted recommendations were ordered.  Otherwise, patient/parent declined.    Health Maintenance Due   Topic Date Due    ANNUAL REVIEW OF HM ORDERS  Never done    ZOSTER IMMUNIZATION (1 of 2) Never done    RSV VACCINE (Pregnancy & 60+) (1 - 1-dose 60+ series) Never done    MEDICARE ANNUAL WELLNESS VISIT  02/03/2021    COVID-19 Vaccine (5 - 2023-24 season) 09/01/2023    LIPID  12/06/2023         Immunization History   Administered Date(s) Administered    COVID-19 Bivalent 12+ (Pfizer) 12/06/2022    COVID-19 MONOVALENT 12+ (Pfizer) 02/27/2021, 03/20/2021,  "11/22/2021    HepB, Unspecified 05/28/2014, 08/19/2014, 02/03/2015    Influenza (High Dose) 3 valent vaccine 10/19/2015, 10/24/2016, 10/02/2019    Influenza Vaccine 65+ (Fluzone HD) 09/15/2020, 09/20/2021, 12/06/2022, 09/11/2023    Pneumo Conj 13-V (2010&after) 08/03/2016    Pneumococcal 23 valent 07/16/2015    TD,PF 7+ (Tenivac) 08/03/2016    TDAP (Adacel,Boostrix) 07/16/2015    Td (Adult), Adsorbed 05/28/2014    Td,adult,historic,unspecified 05/28/2014    Varicella 07/16/2015, 08/03/2016         Patient presents with family member.   A professional [employer] telephone  was utilized for the office visit.     The 10 point review of system is negative except as stated in the HPI.    Allergies were reviewed and updated.    Objective:   /70   Pulse 78   Temp 98.3  F (36.8  C) (Oral)   Resp 14   Ht 1.476 m (4' 10.11\")   Wt 57.7 kg (127 lb 1.9 oz)   SpO2 99%   BMI 26.47 kg/m    General: Active, alert, nontoxic-appearing.  No acute distress.  HEENT: Normocephalic, atraumatic.  Pupils are equal and round.  Sclera is clear.  Normal external ears, ear canal, and TM. No fluid. Nares patent.  Moist mucous membranes.  No cervical lymphadenopathy.   Cardiac: RRR.  S1, S2 present.  No murmurs, rubs, or gallops.  Respiratory/chest: Clear to auscultation bilaterally.  No wheezes, rales, rhonchi.  Breathing is not labored.  No accessory muscle usage.  Extremities: Voluntary movements intact.  Integumentary: No concerning rash or skin changes appreciated.        Luiz Trejo MD  [facility] [place] [address]  Phone: [phone]  Fax: [phone]    2/12/2024  7:31 AM            Current Outpatient Medications   Medication    amLODIPine (NORVASC) 5 MG tablet    atorvastatin (LIPITOR) 80 MG tablet    escitalopram (LEXAPRO) 5 MG tablet    ketotifen fumarate 0.035%, ketotifen 0.025%, (ZADITOR) 0.025 % ophthalmic solution    meclizine (ANTIVERT) 25 MG tablet    omeprazole " (PRILOSEC) 20 MG DR capsule    traZODone (DESYREL) 50 MG tablet     No current facility-administered medications for this visit.       Allergies   Allergen Reactions    Aspirin Unknown     History of GI bleed    No Known Drug Allergy Unknown       Patient Active Problem List    Diagnosis Date Noted    Hypercholesteremia 09/15/2020     Priority: Medium    Dental disease 02/03/2020     Priority: Medium    Low magnesium level 02/23/2019     Priority: Medium    Hypoglycemia 02/23/2019     Priority: Medium    SBO (small bowel obstruction) (H) 02/21/2019     Priority: Medium    Osteoarthritis of knees, bilateral 03/21/2017     Priority: Medium    Hypertension 11/28/2016     Priority: Medium    History of GI bleed - status post emergent laprotomy in [facility] in 2005 08/23/2016     Priority: Medium    Right shoulder tendonitis 10/19/2015     Priority: Medium    Lacunar infarction (H) 07/16/2015     Priority: Medium    Refugee health examination 07/16/2015     Priority: Medium    Gastritis 07/16/2015     Priority: Medium    Adjustment disorder with depressed mood 07/16/2015     Priority: Medium       No family history on file.    Past Surgical History:   Procedure Laterality Date    GASTROJEJUNOSTOMY  2014    [facility]        Social History     Socioeconomic History    Marital status:      Spouse name: Not on file    Number of children: Not on file    Years of education: Not on file    Highest education level: Not on file   Occupational History    Not on file   Tobacco Use    Smoking status: Every Day     Types: Cigarettes     Passive exposure: Never    Smokeless tobacco: Former    Tobacco comments:     Former betel user. No longer using.   Vaping Use    Vaping Use: Never used   Substance and Sexual Activity    Alcohol use: No    Drug use: No    Sexual activity: Never     Partners: Female   Other Topics Concern    Not on file   Social History Narrative    Not on file     Social Determinants of Health     Financial  Resource Strain: Low Risk  (12/6/2023)    Financial Resource Strain     Within the past 12 months, have you or your family members you live with been unable to get utilities (heat, electricity) when it was really needed?: No   Food Insecurity: Low Risk  (12/6/2023)    Food Insecurity     Within the past 12 months, did you worry that your food would run out before you got money to buy more?: No     Within the past 12 months, did the food you bought just not last and you didn t have money to get more?: No   Transportation Needs: Low Risk  (12/6/2023)    Transportation Needs     Within the past 12 months, has lack of transportation kept you from medical appointments, getting your medicines, non-medical meetings or appointments, work, or from getting things that you need?: No   Physical Activity: Not on file   Stress: No Stress Concern Present (12/6/2023)    [employer] - Occupational Stress Questionnaire     Feeling of Stress : Only a little   Social Connections: Not on file   Interpersonal Safety: Not on file   Housing Stability: Low Risk  (12/6/2023)    Housing Stability     Do you have housing? : Yes     Are you worried about losing your housing?: No

## 2024-02-08 LAB
ALBUMIN SERPL BCG-MCNC: 4.1 G/DL (ref 3.5–5.2)
ALP SERPL-CCNC: 91 U/L (ref 40–150)
ALT SERPL W P-5'-P-CCNC: 19 U/L (ref 0–70)
ANION GAP SERPL CALCULATED.3IONS-SCNC: 9 MMOL/L (ref 7–15)
AST SERPL W P-5'-P-CCNC: 30 U/L (ref 0–45)
BILIRUB DIRECT SERPL-MCNC: <0.2 MG/DL (ref 0–0.3)
BILIRUB SERPL-MCNC: 0.5 MG/DL
BUN SERPL-MCNC: 10.2 MG/DL (ref 8–23)
CALCIUM SERPL-MCNC: 9.4 MG/DL (ref 8.8–10.2)
CHLORIDE SERPL-SCNC: 104 MMOL/L (ref 98–107)
CHOLEST SERPL-MCNC: 243 MG/DL
CREAT SERPL-MCNC: 1.11 MG/DL (ref 0.67–1.17)
DEPRECATED HCO3 PLAS-SCNC: 25 MMOL/L (ref 22–29)
EGFRCR SERPLBLD CKD-EPI 2021: 68 ML/MIN/1.73M2
FASTING STATUS PATIENT QL REPORTED: NO
GLUCOSE SERPL-MCNC: 92 MG/DL (ref 70–99)
HDLC SERPL-MCNC: 45 MG/DL
LDLC SERPL CALC-MCNC: 157 MG/DL
LIPASE SERPL-CCNC: 38 U/L (ref 13–60)
NONHDLC SERPL-MCNC: 198 MG/DL
POTASSIUM SERPL-SCNC: 4.4 MMOL/L (ref 3.4–5.3)
PROT SERPL-MCNC: 7.4 G/DL (ref 6.4–8.3)
SODIUM SERPL-SCNC: 138 MMOL/L (ref 135–145)
TRIGL SERPL-MCNC: 207 MG/DL

## 2024-02-08 PROCEDURE — 87338 HPYLORI STOOL AG IA: CPT | Performed by: FAMILY MEDICINE

## 2024-02-09 LAB — H PYLORI AG STL QL IA: NEGATIVE

## 2024-02-12 ENCOUNTER — TELEPHONE (OUTPATIENT)
Dept: OTOLARYNGOLOGY | Facility: CLINIC | Age: 79
End: 2024-02-12
Payer: COMMERCIAL

## 2024-02-15 ENCOUNTER — TELEPHONE (OUTPATIENT)
Dept: FAMILY MEDICINE | Facility: CLINIC | Age: 79
End: 2024-02-15
Payer: COMMERCIAL

## 2024-02-15 DIAGNOSIS — Z87.19 HISTORY OF SMALL BOWEL OBSTRUCTION: ICD-10-CM

## 2024-02-15 DIAGNOSIS — R10.13 ABDOMINAL PAIN, EPIGASTRIC: Primary | ICD-10-CM

## 2024-02-15 DIAGNOSIS — Z87.19 HISTORY OF GI BLEED: ICD-10-CM

## 2024-02-15 DIAGNOSIS — K29.70 GASTRITIS WITHOUT BLEEDING, UNSPECIFIED CHRONICITY, UNSPECIFIED GASTRITIS TYPE: ICD-10-CM

## 2024-02-15 NOTE — TELEPHONE ENCOUNTER
----- Message from Luiz Trejo MD sent at 2/12/2024  3:47 PM CST -----  Team - please call patient with results.    Alona Candice Antonio,    I hope you have been well since our last visit. Below are the results from the testing completed at the visit.     The stool test shows that you do not have the H. pylori infection.  Due to the chronicity of your symptoms, Dr. Trejo would the CT scan of the abdomen to further.  Please let the nurse know.  Continue the statin and order will be placed for you.    The other labs are in process and we will reach out to you once those are completed.    Dr. Trejo recommends that you continue on the plan as discussed in clinic.    If there are any questions or concerns, please call the clinic or schedule an appointment for follow up.     Best wishes,           Luiz Trejo MD  [facility]  2/12/2024  3:45 PM

## 2024-02-15 NOTE — TELEPHONE ENCOUNTER
Orders placed as requested.     Luiz Trejo MD  [facility]  2/15/2024  5:09 PM    Pah was seen today for results.    Diagnoses and all orders for this visit:    Abdominal pain, epigastric  -     CT Abdomen w/o Contrast; Future    Gastritis without bleeding, unspecified chronicity, unspecified gastritis type  -     CT Abdomen w/o Contrast; Future    History of GI bleed - status post emergent laprotomy in [place] in 2005  -     CT Abdomen w/o Contrast; Future    History of small bowel obstruction  -     CT Abdomen w/o Contrast; Future

## 2024-02-15 NOTE — TELEPHONE ENCOUNTER
Called pt and relayed message to his daughter (C2C on file). She verbalized understanding and stated they would like to do the CT scan. Per chart review, no orders yet.        Jacques Au Cem Say, BSN RN  [facility]

## 2024-03-07 DIAGNOSIS — H90.5 SENSORINEURAL HEARING LOSS (SNHL), UNSPECIFIED LATERALITY: Primary | ICD-10-CM

## 2024-03-25 ENCOUNTER — APPOINTMENT (OUTPATIENT)
Dept: INTERPRETER SERVICES | Facility: CLINIC | Age: 79
End: 2024-03-25
Payer: COMMERCIAL

## 2024-07-08 ENCOUNTER — PATIENT OUTREACH (OUTPATIENT)
Dept: GERIATRIC MEDICINE | Facility: CLINIC | Age: 79
End: 2024-07-08
Payer: COMMERCIAL

## 2024-07-08 NOTE — PROGRESS NOTES
[facility] Care Coordination Contact      [facility] Six-Month Telephone Assessment    6 month telephone assessment completed on 7/8/24.    ER visits: No  Hospitalizations: No  TCU stays: No  Significant health status changes: no  Falls/Injuries: No  ADL/IADL changes: No  Changes in services: No    Caregiver Assessment follow up:  n/a    Goals: See POC in chart for goal progress documentation.      Will see member in 6 months for an annual health risk assessment.   Encouraged member to call CC with any questions or concerns in the meantime.     Isabel Brady RN  [facility]  [phone]

## 2024-08-20 ENCOUNTER — VIRTUAL VISIT (OUTPATIENT)
Dept: FAMILY MEDICINE | Facility: CLINIC | Age: 79
End: 2024-08-20
Payer: COMMERCIAL

## 2024-08-20 DIAGNOSIS — H91.93 DECREASED HEARING OF BOTH EARS: ICD-10-CM

## 2024-08-20 DIAGNOSIS — Z87.19 HISTORY OF GI BLEED: ICD-10-CM

## 2024-08-20 DIAGNOSIS — G47.00 INSOMNIA, UNSPECIFIED TYPE: ICD-10-CM

## 2024-08-20 DIAGNOSIS — E78.00 HYPERCHOLESTEREMIA: ICD-10-CM

## 2024-08-20 DIAGNOSIS — F43.21 ADJUSTMENT DISORDER WITH DEPRESSED MOOD: ICD-10-CM

## 2024-08-20 DIAGNOSIS — I10 HYPERTENSION, UNSPECIFIED TYPE: Primary | ICD-10-CM

## 2024-08-20 DIAGNOSIS — R42 VERTIGO: ICD-10-CM

## 2024-08-20 PROCEDURE — 99441 PR PHYSICIAN TELEPHONE EVALUATION 5-10 MIN: CPT | Mod: 93 | Performed by: FAMILY MEDICINE

## 2024-08-20 PROCEDURE — T1013 SIGN LANG/ORAL INTERPRETER: HCPCS | Mod: U4

## 2024-08-20 RX ORDER — MECLIZINE HYDROCHLORIDE 25 MG/1
25 TABLET ORAL 3 TIMES DAILY PRN
Qty: 90 TABLET | Refills: 3 | Status: SHIPPED | OUTPATIENT
Start: 2024-08-20

## 2024-08-20 RX ORDER — AMLODIPINE BESYLATE 5 MG/1
5 TABLET ORAL DAILY
Qty: 90 TABLET | Refills: 3 | Status: SHIPPED | OUTPATIENT
Start: 2024-08-20

## 2024-08-20 RX ORDER — ESCITALOPRAM OXALATE 5 MG/1
5 TABLET ORAL DAILY
Qty: 90 TABLET | Refills: 3 | Status: SHIPPED | OUTPATIENT
Start: 2024-08-20

## 2024-08-20 RX ORDER — ATORVASTATIN CALCIUM 80 MG/1
80 TABLET, FILM COATED ORAL DAILY
Qty: 90 TABLET | Refills: 3 | Status: SHIPPED | OUTPATIENT
Start: 2024-08-20

## 2024-08-20 RX ORDER — TRAZODONE HYDROCHLORIDE 50 MG/1
25-50 TABLET, FILM COATED ORAL AT BEDTIME
Qty: 90 TABLET | Refills: 3 | Status: SHIPPED | OUTPATIENT
Start: 2024-08-20

## 2024-08-20 NOTE — PROGRESS NOTES
Telehealth Visit      Provider discussed the limitations of the telehealth visit and patient would like to proceed with the encounter.  Both patient and provider agree that a telehealth visit would be appropriate to address patient's concerns today.    Location of patient: [place]  Location of provider: [place]    Time at start of telehealth visit: 5:05 PM  Time at start of telehealth visit: 5:13 PM  Time spent in direct cares for telehealth visit: 8 minutes        Assessment/Plan:     Patient Instructions:    -Continue medications as prescribed.  -Follow up in 3 months for a medicare wellness visit and medication check.    Please seek immediate medical attention (go to the emergency room or urgent care) for the following reasons: worsening symptoms, medication side effect, or any concerning changes.      Pah was seen today for recheck medication.  Diagnoses and all orders for this visit:    Hypertension, unspecified type: Stable.  Refill as below.  -     amLODIPine (NORVASC) 5 MG tablet; Take 1 tablet (5 mg) by mouth daily    Hypercholesteremia: Stable.  Refill as below.  -     atorvastatin (LIPITOR) 80 MG tablet; Take 1 tablet (80 mg) by mouth daily    Adjustment disorder with depressed mood: Stable.  Refill as below.  -     escitalopram (LEXAPRO) 5 MG tablet; Take 1 tablet (5 mg) by mouth daily    History of GI bleed - status post emergent laprotomy in [place] in 2005: Stable.  Refill as below.  -     omeprazole (PRILOSEC) 20 MG DR capsule; Take 1 capsule (20 mg) by mouth 2 times daily (before meals)    Insomnia, unspecified type: Stable.  Refill as below.  -     traZODone (DESYREL) 50 MG tablet; Take 0.5-1 tablets (25-50 mg) by mouth at bedtime    Decreased hearing of both ears  Vertigo: Stable.  Refill as below.  Patient uses this medication intermittently.  Continues to be helpful.  Hearing overall unchanged.  -     meclizine (ANTIVERT) 25 MG tablet; Take 1 tablet (25 mg) by mouth 3 times daily as  needed for dizziness or nausea        Return in about 3 months (around 11/20/2024) for Medicare Wellness Visit.    The diagnoses, treatment options, risk, benefits, and recommendations were reviewed with patient/guardian.  Questions were answered to patient's/guardian satisfaction.  Red flag signs were reviewed.  Patient/guardian is in agreement with above plan.      Subjective: 79 year old male with history of hypertension, hypercholesterolemia, gastritis, small bowel obstruction, history of GI bleed, osteoarthritis of knees (bilateral), dental disease who presents to clinic for the following complaints:   Patient presents with:  Recheck Medication: Patient said he ran out all his medications and need all medication refill     Answers submitted by the patient for this visit:  General Questionnaire (Submitted on 8/20/2024)  Chief Complaint: Chronic problems general questions HPI Form  What is the reason for your visit today? : recheck medication  How many servings of fruits and vegetables do you eat daily?: 2-3  On average, how many sweetened beverages do you drink each day (Examples: soda, juice, sweet tea, etc.  Do NOT count diet or artificially sweetened beverages)?: 0  How many minutes a day do you exercise enough to make your heart beat faster?: 20 to 29  How many days a week do you exercise enough to make your heart beat faster?: 7  How many days per week do you miss taking your medication?: 0    Patient has been stable on all medications.  Denies any particular issues or side effects on any of them.  He takes medications regularly.  He ran out of medications recently and so needs a refill.  This is consistent with the prescription that had been sent in 09/2023.    Reviewed all medications, indications, risks and benefits.  Questions answered.  Mood stable. Has not had BP check recently.  Reviewed appropriate blood pressure checks.  He has an in person appointment on 11/2024.  The trazodone was helpful and  patient used it regularly. Stomach symptoms have been controlled on the medication. Dizziness is noted recently, though the meclizine is helpful for him in the past.     He still has the eye drop medication as he just saw the eye doctor recently and got a refill of the medication.     Patient presents with daughter.   A professional [employer] telephone  was utilized for the office visit.     The 10 point review of system is negative except as stated in the HPI.    Allergies were reviewed and updated.    Objective:   There were no vitals taken for this visit.  General: Active, alert, no acute distress.  Respiratory/chest: Speaking full sentences.  Breathing is not labored.        Luiz Trejo MD  [facility] [place] [address]  Phone: [phone]  Fax: [phone]    8/22/2024  8:23 AM          Current Outpatient Medications   Medication Sig Dispense Refill    amLODIPine (NORVASC) 5 MG tablet Take 1 tablet (5 mg) by mouth daily 90 tablet 3    atorvastatin (LIPITOR) 80 MG tablet Take 1 tablet (80 mg) by mouth daily 90 tablet 3    escitalopram (LEXAPRO) 5 MG tablet Take 1 tablet (5 mg) by mouth daily 90 tablet 3    ketotifen fumarate 0.035%, ketotifen 0.025%, (ZADITOR) 0.025 % ophthalmic solution Place 1 drop into both eyes 2 times daily 10 mL 4    meclizine (ANTIVERT) 25 MG tablet Take 1 tablet (25 mg) by mouth 3 times daily as needed for dizziness or nausea 90 tablet 3    omeprazole (PRILOSEC) 20 MG DR capsule Take 1 capsule (20 mg) by mouth 2 times daily (before meals) 180 capsule 3    traZODone (DESYREL) 50 MG tablet Take 0.5-1 tablets (25-50 mg) by mouth at bedtime 90 tablet 3     No current facility-administered medications for this visit.       Allergies   Allergen Reactions    Aspirin Unknown     History of GI bleed    No Known Drug Allergy Unknown       Patient Active Problem List    Diagnosis Date Noted    Hypercholesteremia 09/15/2020     Priority: Medium     Dental disease 02/03/2020     Priority: Medium    Low magnesium level 02/23/2019     Priority: Medium    Hypoglycemia 02/23/2019     Priority: Medium    SBO (small bowel obstruction) (H) 02/21/2019     Priority: Medium    Osteoarthritis of knees, bilateral 03/21/2017     Priority: Medium    Hypertension 11/28/2016     Priority: Medium    History of GI bleed - status post emergent laprotomy in [facility] [place] in 2005 08/23/2016     Priority: Medium    Right shoulder tendonitis 10/19/2015     Priority: Medium    Lacunar infarction (H) 07/16/2015     Priority: Medium    Refugee health examination 07/16/2015     Priority: Medium    Gastritis 07/16/2015     Priority: Medium    Adjustment disorder with depressed mood 07/16/2015     Priority: Medium       No family history on file.    Past Surgical History:   Procedure Laterality Date    GASTROJEJUNOSTOMY  2014    [facility] [place]        Social History     Socioeconomic History    Marital status:      Spouse name: Not on file    Number of children: Not on file    Years of education: Not on file    Highest education level: Not on file   Occupational History    Not on file   Tobacco Use    Smoking status: Every Day     Types: Cigarettes     Passive exposure: Never    Smokeless tobacco: Former    Tobacco comments:     Former betel user. No longer using.   Vaping Use    Vaping status: Never Used   Substance and Sexual Activity    Alcohol use: No    Drug use: No    Sexual activity: Never     Partners: Female   Other Topics Concern    Not on file   Social History Narrative    Not on file     Social Determinants of Health     Financial Resource Strain: Low Risk  (12/6/2023)    Financial Resource Strain     Within the past 12 months, have you or your family members you live with been unable to get utilities (heat, electricity) when it was really needed?: No   Food Insecurity: Low Risk  (12/6/2023)    Food Insecurity     Within the past 12 months, did you worry that your food would run out  before you got money to buy more?: No     Within the past 12 months, did the food you bought just not last and you didn t have money to get more?: No   Transportation Needs: Low Risk  (12/6/2023)    Transportation Needs     Within the past 12 months, has lack of transportation kept you from medical appointments, getting your medicines, non-medical meetings or appointments, work, or from getting things that you need?: No   Physical Activity: Not on file   Stress: No Stress Concern Present (12/6/2023)    [employer] - Occupational Stress Questionnaire     Feeling of Stress : Only a little   Social Connections: Not on file   Interpersonal Safety: Not on file   Housing Stability: Low Risk  (12/6/2023)    Housing Stability     Do you have housing? : Yes     Are you worried about losing your housing?: No

## 2024-08-20 NOTE — PATIENT INSTRUCTIONS
-Thank you for choosing the [facility].  -It was a pleasure to see you today.  -Please take a look at the information below for more specific details regarding the treatment plan and recommendations.  -In this after visit summary is a list of your medications and specific instructions.  Please review this carefully as there may be changes made to your medication list.  -If there are any particular questions or concerns, please feel free to reach out to Dr. Trejo.  -If any labs have been completed, we will reach out to you about results.  If the results are normal or not concerning, a letter or Amitivehart message will be sent to you.  If any follow-up is needed, either Dr. Trejo or the nurse will give you a call.  If you have not heard regarding results after 2 weeks, please reach out to the clinic.    Patient Instructions:    -Continue medications as prescribed.  -Follow up in 3 months for a medicare wellness visit and medication check.    Please seek immediate medical attention (go to the emergency room or urgent care) for the following reasons: worsening symptoms, medication side effect, or any concerning changes.      --------------------------------------------------------------------------------------------------------------------    -We are always looking for ways to improve.  You may be selected to receive a survey regarding your visit today.  We encourage you to complete the survey and provide specific, constructive feedback to help us improve our processes.  Thank you for your time!  -Please review the contact information listed on the after visit summary and in the electronic chart.  Below is the phone number that we have on file.  If there are any changes that are needed to be made, please reach out to the clinic.  [phone] (home)

## 2024-08-22 PROBLEM — R42 VERTIGO: Status: ACTIVE | Noted: 2024-08-22

## 2024-08-22 PROBLEM — H91.93 DECREASED HEARING OF BOTH EARS: Status: ACTIVE | Noted: 2024-08-22

## 2024-08-22 PROBLEM — G47.00 INSOMNIA, UNSPECIFIED TYPE: Status: ACTIVE | Noted: 2024-08-22

## 2024-11-20 ENCOUNTER — OFFICE VISIT (OUTPATIENT)
Dept: FAMILY MEDICINE | Facility: CLINIC | Age: 79
End: 2024-11-20
Payer: COMMERCIAL

## 2024-11-20 ENCOUNTER — ANCILLARY PROCEDURE (OUTPATIENT)
Dept: GENERAL RADIOLOGY | Facility: CLINIC | Age: 79
End: 2024-11-20
Attending: FAMILY MEDICINE
Payer: COMMERCIAL

## 2024-11-20 ENCOUNTER — TRANSFERRED RECORDS (OUTPATIENT)
Dept: HEALTH INFORMATION MANAGEMENT | Facility: CLINIC | Age: 79
End: 2024-11-20

## 2024-11-20 VITALS
RESPIRATION RATE: 20 BRPM | OXYGEN SATURATION: 98 % | SYSTOLIC BLOOD PRESSURE: 92 MMHG | DIASTOLIC BLOOD PRESSURE: 58 MMHG | BODY MASS INDEX: 24.16 KG/M2 | TEMPERATURE: 98.2 F | HEIGHT: 60 IN | HEART RATE: 68 BPM | WEIGHT: 123.04 LBS

## 2024-11-20 DIAGNOSIS — M25.561 CHRONIC PAIN OF BOTH KNEES: ICD-10-CM

## 2024-11-20 DIAGNOSIS — Z71.6 ENCOUNTER FOR TOBACCO USE CESSATION COUNSELING: ICD-10-CM

## 2024-11-20 DIAGNOSIS — Z23 NEED FOR VACCINATION: ICD-10-CM

## 2024-11-20 DIAGNOSIS — I10 HYPERTENSION, UNSPECIFIED TYPE: ICD-10-CM

## 2024-11-20 DIAGNOSIS — E78.00 HYPERCHOLESTEREMIA: ICD-10-CM

## 2024-11-20 DIAGNOSIS — K76.0 FATTY LIVER: ICD-10-CM

## 2024-11-20 DIAGNOSIS — G89.29 CHRONIC PAIN OF BOTH KNEES: ICD-10-CM

## 2024-11-20 DIAGNOSIS — R07.9 EXERTIONAL CHEST PAIN: ICD-10-CM

## 2024-11-20 DIAGNOSIS — Z87.19 HX SBO: ICD-10-CM

## 2024-11-20 DIAGNOSIS — Z00.00 ROUTINE GENERAL MEDICAL EXAMINATION AT A HEALTH CARE FACILITY: Primary | ICD-10-CM

## 2024-11-20 DIAGNOSIS — M25.562 CHRONIC PAIN OF BOTH KNEES: ICD-10-CM

## 2024-11-20 DIAGNOSIS — K29.70 GASTRITIS WITHOUT BLEEDING, UNSPECIFIED CHRONICITY, UNSPECIFIED GASTRITIS TYPE: ICD-10-CM

## 2024-11-20 DIAGNOSIS — I63.81 LACUNAR INFARCTION (H): ICD-10-CM

## 2024-11-20 DIAGNOSIS — R07.89 CHEST WALL PAIN: ICD-10-CM

## 2024-11-20 LAB
ATRIAL RATE - MUSE: 61 BPM
BASOPHILS # BLD AUTO: 0.1 10E3/UL (ref 0–0.2)
BASOPHILS NFR BLD AUTO: 1 %
DIASTOLIC BLOOD PRESSURE - MUSE: NORMAL MMHG
EOSINOPHIL # BLD AUTO: 0.2 10E3/UL (ref 0–0.7)
EOSINOPHIL NFR BLD AUTO: 3 %
ERYTHROCYTE [DISTWIDTH] IN BLOOD BY AUTOMATED COUNT: 13 % (ref 10–15)
EST. AVERAGE GLUCOSE BLD GHB EST-MCNC: 91 MG/DL
HBA1C MFR BLD: 4.8 % (ref 0–5.6)
HCT VFR BLD AUTO: 38 % (ref 40–53)
HGB BLD-MCNC: 12.9 G/DL (ref 13.3–17.7)
IMM GRANULOCYTES # BLD: 0 10E3/UL
IMM GRANULOCYTES NFR BLD: 0 %
INTERPRETATION ECG - MUSE: NORMAL
LYMPHOCYTES # BLD AUTO: 2 10E3/UL (ref 0.8–5.3)
LYMPHOCYTES NFR BLD AUTO: 36 %
MCH RBC QN AUTO: 33.8 PG (ref 26.5–33)
MCHC RBC AUTO-ENTMCNC: 33.9 G/DL (ref 31.5–36.5)
MCV RBC AUTO: 100 FL (ref 78–100)
MONOCYTES # BLD AUTO: 0.5 10E3/UL (ref 0–1.3)
MONOCYTES NFR BLD AUTO: 9 %
NEUTROPHILS # BLD AUTO: 2.8 10E3/UL (ref 1.6–8.3)
NEUTROPHILS NFR BLD AUTO: 51 %
P AXIS - MUSE: 53 DEGREES
PLATELET # BLD AUTO: 193 10E3/UL (ref 150–450)
PR INTERVAL - MUSE: 220 MS
QRS DURATION - MUSE: 86 MS
QT - MUSE: 458 MS
QTC - MUSE: 461 MS
R AXIS - MUSE: 20 DEGREES
RBC # BLD AUTO: 3.82 10E6/UL (ref 4.4–5.9)
SYSTOLIC BLOOD PRESSURE - MUSE: NORMAL MMHG
T AXIS - MUSE: 55 DEGREES
VENTRICULAR RATE- MUSE: 61 BPM
WBC # BLD AUTO: 5.6 10E3/UL (ref 4–11)

## 2024-11-20 RX ORDER — POLYETHYLENE GLYCOL 3350 17 G
2 POWDER IN PACKET (EA) ORAL
Qty: 168 LOZENGE | Refills: 2 | Status: SHIPPED | OUTPATIENT
Start: 2024-11-20

## 2024-11-20 RX ORDER — NITROGLYCERIN 0.4 MG/1
TABLET SUBLINGUAL
Qty: 25 TABLET | Refills: 6 | Status: SHIPPED | OUTPATIENT
Start: 2024-11-20

## 2024-11-20 RX ORDER — ACETAMINOPHEN 500 MG
500-1000 TABLET ORAL EVERY 6 HOURS PRN
Qty: 100 TABLET | Refills: 3 | Status: SHIPPED | OUTPATIENT
Start: 2024-11-20

## 2024-11-20 SDOH — HEALTH STABILITY: PHYSICAL HEALTH: ON AVERAGE, HOW MANY MINUTES DO YOU ENGAGE IN EXERCISE AT THIS LEVEL?: 10 MIN

## 2024-11-20 SDOH — HEALTH STABILITY: PHYSICAL HEALTH: ON AVERAGE, HOW MANY DAYS PER WEEK DO YOU ENGAGE IN MODERATE TO STRENUOUS EXERCISE (LIKE A BRISK WALK)?: 7 DAYS

## 2024-11-20 ASSESSMENT — PAIN SCALES - GENERAL: PAINLEVEL_OUTOF10: WORST PAIN (10)

## 2024-11-20 ASSESSMENT — SOCIAL DETERMINANTS OF HEALTH (SDOH): HOW OFTEN DO YOU GET TOGETHER WITH FRIENDS OR RELATIVES?: NEVER

## 2024-11-20 NOTE — PROGRESS NOTES
Preventive Care Visit  [facility] NINA Trejo MD, Family Medicine  Nov 20, 2024    Assessment & Plan     Patient Instructions:    -Stop smoking cigarettes.  This is the best thing you can do to improve your health.  -Use the lozenge to help you quit smoking.  -Use the medications as prescribed.     -Continue to eat well.  Try to increase your servings of calcium as this can help your bones stay strong and healthy.  Follow a nutrition plan rich in fruits and vegetables and low in fats and cholesterol.    -Be sure to eat 5-7 servings of fruits and vegetables each day.  -Find ways to stay active.  Try to get 150 minutes of moderate activity (where you are breathing faster and slightly sweating) each week.  -Try to maintain a body mass index (BMI) of 18.5-25 as this is considered a healthier weight range.  -Brush your teeth twice daily.  See a dentist every 6-12 months.  -Be sure to use sunblock with SPF 15 or greater when going outside for extended periods of time.  Sunblock should be used even when it is a cloudy day.  Do intermittent skin checks for any concerning skin changes.  Wearing a wide brimmed hat and sunglasses can also be helpful to protect your skin from the sun.  -Monitor for any abnormal skin changes (such as new moles/spots, painful moles, changes in your old moles, wounds that will not heal, multiple colors noted in one lesion, lesions that are asymmetric or not circular, or anything that is concerning for you). If any of these are noted, please schedule an appointment to be seen.     -It is generally recommended for you to complete a health care directive or living will. These documents will be able to reflect your wishes and desire in the case that you are unable to express them yourself. Please let Dr. Trejo know if you would like some assistance with this process.    Please seek immediate medical attention (go to the emergency room or urgent care) for the following reasons:  worsening symptoms, or any concerning changes.      Pah was seen today for physical and recheck medication.  Diagnoses and all orders for this visit:    Routine general medical examination at a health care facility  Exertional chest pain  Hypertension, unspecified type  Hypercholesteremia  Lacunar infarction (H)  Chest wall pain: symptom history and risk factors suggestive of ACS. Asymptomatic at this time. Palpation of the chest wall also reproduces the discomforts, however. Evaluate as potential ACS at this time. Due to age and exercise intolerance, plan for NM Lexiscan. Further recommendations pending results. Provider discussed concerns and treatment recommendations as well as red flag signs. EKG today demonstrates NSR. No concerning ST or T wave changes noted.   -     EKG 12-lead, tracing only  -     nitroGLYcerin (NITROSTAT) 0.4 MG sublingual tablet; For chest pain place 1 tablet under the tongue every 5 minutes for 3 doses. If symptoms persist 5 minutes after 1st dose call 911.  -     NM Lexiscan stress test; Future  -     Basic metabolic panel; Future  -     CBC with Platelets & Differential; Future  -     Hemoglobin A1c; Future  -     Hepatic function panel; Future  -     Lipid panel reflex to direct LDL Non-fasting; Future  -     CXR ordered.      Chronic pain of both knees: likely OA. Possible mensical/soft tissue injury. Plan as below.   -     XR Knee Bilateral 3 Views; Future  -     acetaminophen (TYLENOL) 500 MG tablet; Take 1-2 tablets (500-1,000 mg) by mouth every 6 hours as needed for fever or pain. (Max of 3,000 mg/day)    Gastritis without bleeding, unspecified chronicity, unspecified gastritis type  Hx SBO  Fatty liver: Noted on CT scan from 2019. Patient doesn't endorse abdominal discomforts today. Recheck labs below.   Orders:  -     Hepatic function panel; Future  -     Lipid panel reflex to direct LDL Non-fasting; Future    Need for vaccination  -     INFLUENZA HIGH DOSE, TRIVALENT, PF  "(FLUZONE)    Encounter for tobacco use cessation counseling  -     nicotine (COMMIT) 2 MG lozenge; Place 1 lozenge (2 mg) inside cheek every hour as needed for nicotine withdrawal symptoms.    Other orders  -     PRIMARY CARE FOLLOW-UP SCHEDULING; Future          Patient has been advised of split billing requirements and indicates understanding: Yes        Nicotine/Tobacco Cessation  He reports that he has been smoking cigarettes. He has never been exposed to tobacco smoke. He has quit using smokeless tobacco.  Nicotine/Tobacco Cessation Plan  Reviewed recommendations.       BMI  Estimated body mass index is 25.14 kg/m  as calculated from the following:    Height as of this encounter: 1.49 m (4' 10.66\").    Weight as of this encounter: 55.8 kg (123 lb 0.6 oz).       Counseling  Appropriate preventive services were addressed with this patient via screening, questionnaire, or discussion as appropriate for fall prevention, nutrition, physical activity, Tobacco-use cessation, social engagement, weight loss and cognition.  Checklist reviewing preventive services available has been given to the patient.  Reviewed patient's diet, addressing concerns and/or questions.   Patient is at risk for social isolation and has been provided with information about the benefit of social connection.   The patient was instructed to see the dentist every 6 months.   Updated plan of care.  Patient reported difficulty with activities of daily living were addressed today.The patient was provided with written information regarding signs of hearing loss.         Subjective   Pah is a 79 year old, presenting for the following:  Physical (Patient states he has chest pain and in hands and both feet. ) and Recheck Medication (Follow up )    Chest pains: Pains noted for 1-2 month now. The pain is off and on. The pain is noted during the day and night time. The pain can be really bad and rated as a 10 at times. The pain is located at the in the left " chest  and midsternal area. Chest pain does worsen with exertion some times. The chest pain is not noted at rest.    Last CXR was from 09/2023. Patient has a baseline cough. The coughing is not too bad the last couple of weeks.       Pain in hands and feet: noted bilaterally. Started a long time ago. He only massages and that helps a little, but note much. The pain is worse in the knees. No complains of hand pains.     H pylori test negative from 02/2024.     HTN: controlled today on amlodipine 5mg.         11/20/2024     1:29 PM   Additional Questions   Roomed by Marija CALVILLO   Accompanied by Daughter      A professional Tiffanie  was utilized for the office visit.      HPI      -Reviewed healthy eating and exercise.  -Skin cancer screening: No concerning skin changes expressed by patient.  -Smoking status:   Tobacco Use      Smoking status: Every Day        Types: Cigarettes        Passive exposure: Never      Smokeless tobacco: Former      Tobacco comments: Former betel user. No longer using.      Reviewed smoking cessation recommendations.  He quit chewing betel nut before.       -Family history: CAD, HTN, DM: none  Family History   Problem Relation Age of Onset    Coronary Artery Disease No family hx of     Diabetes No family hx of     Hypertension No family hx of        Preventative health recommendations, evaluation options, and risk/benefits of each were discussed with patient. Accepted recommendations were ordered. Otherwise, patient declined.  Health Maintenance Due   Topic Date Due    ANNUAL REVIEW OF  ORDERS  Never done    ZOSTER IMMUNIZATION (1 of 2) Never done    LUNG CANCER SCREENING  Never done    RSV VACCINE (1 - 1-dose 75+ series) Never done    INFLUENZA VACCINE (1) 09/01/2024    COVID-19 Vaccine (6 - 2024-25 season) 09/01/2024       -Immunizations due were reviewed.    Immunization History   Administered Date(s) Administered    COVID-19 12+ (Pfizer) 02/07/2024    COVID-19 Bivalent  12+ (Pfizer) 12/06/2022    COVID-19 MONOVALENT 12+ (Pfizer) 02/27/2021, 03/20/2021, 11/22/2021    HepB, Unspecified 05/28/2014, 08/19/2014, 02/03/2015    Influenza (High Dose) Trivalent,PF (Fluzone) 10/19/2015, 10/24/2016, 10/02/2019    Influenza Vaccine 65+ (Fluzone HD) 09/15/2020, 09/20/2021, 12/06/2022, 09/11/2023    Pneumo Conj 13-V (2010&after) 08/03/2016    Pneumococcal 23 valent 07/16/2015    TD,PF 7+ (Tenivac) 08/03/2016    TDAP (Adacel,Boostrix) 07/16/2015    Td (Adult), Adsorbed 05/28/2014    Td,adult,historic,unspecified 05/28/2014    Varicella 07/16/2015, 08/03/2016       -Labs: Laboratory recommendations reviewed with patient.      Narrative & Impression   EXAM: XR CHEST 2 VIEWS  LOCATION: [facility]  DATE: 9/11/2023     INDICATION:  Cough, unspecified type  COMPARISON: None.                                                                      IMPRESSION: Negative chest.       Health Care Directive  Patient does not have a Health Care Directive: Discussed advance care planning with patient; information given to patient to review.      11/20/2024   General Health   How would you rate your overall physical health? (!) FAIR   Feel stress (tense, anxious, or unable to sleep) Not at all            11/20/2024   Nutrition   Diet: Regular (no restrictions)            11/20/2024   Exercise   Days per week of moderate/strenous exercise 7 days   Average minutes spent exercising at this level 10 min            11/20/2024   Social Factors   Frequency of gathering with friends or relatives Never   Worry food won't last until get money to buy more No   Food not last or not have enough money for food? No   Do you have housing? (Housing is defined as stable permanent housing and does not include staying ouside in a car, in a tent, in an abandoned building, in an overnight shelter, or couch-surfing.) Yes   Are you worried about losing your housing? No   Lack of transportation? No   Unable to get  utilities (heat,electricity)? No      (!) SOCIAL CONNECTIONS CONCERN      11/20/2024   Fall Risk   Fallen 2 or more times in the past year? No    Trouble with walking or balance? No        Patient-reported          11/20/2024   Activities of Daily Living- Home Safety   Needs help with the following daily activites Preparing meals    Housework    Laundry    Medication administration    Dressing   Safety concerns in the home None of the above       Multiple values from one day are sorted in reverse-chronological order         11/20/2024   Dental   Dentist two times every year? (!) NO            11/20/2024   Hearing Screening   Hearing concerns? (!) I FEEL THAT PEOPLE ARE MUMBLING OR NOT SPEAKING CLEARLY.    (!) TROUBLE UNDERSTANDING SPEECH ON THE TELEPHONE       Multiple values from one day are sorted in reverse-chronological order         11/20/2024   Driving Risk Screening   Patient/family members have concerns about driving No            11/20/2024   General Alertness/Fatigue Screening   Have you been more tired than usual lately? No            11/20/2024   Urinary Incontinence Screening   Bothered by leaking urine in past 6 months No               Today's PHQ-2 Score:       11/20/2024     1:56 PM   PHQ-2 ( 1999 Pfizer)   Q1: Little interest or pleasure in doing things 0    Q2: Feeling down, depressed or hopeless 0    PHQ-2 Score 0    Q1: Little interest or pleasure in doing things Not at all   Q2: Feeling down, depressed or hopeless Not at all   PHQ-2 Score 0       Patient-reported           11/20/2024   Substance Use   Alcohol more than 3/day or more than 7/wk Not Applicable   Do you have a current opioid prescription? No   How severe/bad is pain from 1 to 10? 10/10   Do you use any other substances recreationally? No        Social History     Tobacco Use    Smoking status: Every Day     Types: Cigarettes     Passive exposure: Never    Smokeless tobacco: Former    Tobacco comments:     Former betel user. No  longer using.   Vaping Use    Vaping status: Never Used   Substance Use Topics    Alcohol use: No    Drug use: No       ASCVD Risk   The ASCVD Risk score (Mirlande DK, et al., 2019) failed to calculate for the following reasons:    Risk score cannot be calculated because patient has a medical history suggesting prior/existing ASCVD          Reviewed and updated as needed this visit by Provider         Morgan Ralph            No past medical history on file.  Past Surgical History:   Procedure Laterality Date    GASTROJEJUNOSTOMY  2014    [place]     Current providers sharing in care for this patient include:  Patient Care Team:  Jose Snyder MD as PCP - General  Isabel Brady, RN as Lead Care Coordinator (Primary Care - CC)  Osei Ding MD as MD (Otolaryngology)  Betty Rodriguez AuD as Audiologist (Audiology)  Luiz Trejo MD as Assigned PCP    The following health maintenance items are reviewed in Epic and correct as of today:  Health Maintenance   Topic Date Due    ANNUAL REVIEW OF HM ORDERS  Never done    ZOSTER IMMUNIZATION (1 of 2) Never done    LUNG CANCER SCREENING  Never done    RSV VACCINE (1 - 1-dose 75+ series) Never done    COVID-19 Vaccine (6 - 2024-25 season) 09/01/2024    BMP  02/07/2025    LIPID  02/07/2025    NICOTINE/TOBACCO CESSATION COUNSELING Q 1 YR  11/20/2025    MEDICARE ANNUAL WELLNESS VISIT  11/20/2025    FALL RISK ASSESSMENT  11/20/2025    DTAP/TDAP/TD IMMUNIZATION (3 - Td or Tdap) 08/03/2026    GLUCOSE  02/07/2027    ADVANCE CARE PLANNING  11/20/2029    HEPATITIS C SCREENING  Completed    PHQ-2 (once per calendar year)  Completed    INFLUENZA VACCINE  Completed    Pneumococcal Vaccine: 65+ Years  Completed    HPV IMMUNIZATION  Aged Out    MENINGITIS IMMUNIZATION  Aged Out    RSV MONOCLONAL ANTIBODY  Aged Out       The 10 point review of system was negative unless otherwise stated in the HPI.       Objective    Exam  BP 92/58   Pulse 68   Temp 98.2  F (36.8  C) (Oral)   Resp  "20   Ht 1.49 m (4' 10.66\")   Wt 55.8 kg (123 lb 0.6 oz)   SpO2 98%   BMI 25.14 kg/m     Estimated body mass index is 25.14 kg/m  as calculated from the following:    Height as of this encounter: 1.49 m (4' 10.66\").    Weight as of this encounter: 55.8 kg (123 lb 0.6 oz).    Physical Exam  GENERAL: alert and no distress  EYES: Eyes grossly normal to inspection, PERRL and conjunctivae and sclerae normal  HENT: ear canals and TM's normal, nose and mouth without ulcers or lesions  NECK: no adenopathy, no asymmetry, masses, or scars  RESP: Rales noted in the posterior inferior lung field bilaterally. Chest: pain to palpation of the sternum and parasternal regions. Otherwise, lungs clear to auscultation - no rhonchi or wheezes.   CV: regular rate and rhythm, normal S1 S2, no S3 or S4, no murmur, click or rub, no peripheral edema  ABDOMEN: well healed abdominal scars noted. Abdomen is soft, nontender, no hepatosplenomegaly, no masses and bowel sounds normal  MS: Lower extremities: Knees: No redness, swelling, knee effusion Normal ROM. No locking. Positive Josiah sign with varus and valgus stress. Crepitus present. NO edema. Tatoos noted on both thighs. Otherwise, no gross musculoskeletal defects noted, no edema  SKIN: no suspicious lesions or rashes  NEURO: Normal strength and tone, mentation intact and speech normal  PSYCH: mentation appears normal, affect normal/bright        11/20/2024   Mini Cog   Mini-Cog Not Completed (choose reason) Patient declines   Clock Draw Score 0 Abnormal   3 Item Recall 1 object recalled   Mini Cog Total Score 1            Signed Electronically by:     Luiz Trejo MD  [facility] [place] [address]  Phone: [phone]  Fax: [phone]    11/20/2024  2:42 PM  "

## 2024-11-20 NOTE — PATIENT INSTRUCTIONS
-Thank you for choosing the [facility].  -It was a pleasure to see you today.  -Please take a look at the information below for more specific details regarding the treatment plan and recommendations.  -In this after visit summary is a list of your medications and specific instructions.  Please review this carefully as there may be changes made to your medication list.  -If there are any particular questions or concerns, please feel free to reach out to Dr. Trejo.  -If any labs have been completed, we will reach out to you about results.  If the results are normal or not concerning, a letter or MyChart message will be sent to you.  If any follow-up is needed, either Dr. Trejo or the nurse will give you a call.  If you have not heard regarding results after 2 weeks, please reach out to the clinic.    Patient Instructions:    -Stop smoking cigarettes.  This is the best thing you can do to improve your health.  -Use the lozenge to help you quit smoking.  -Use the medications as prescribed.     -Continue to eat well.  Try to increase your servings of calcium as this can help your bones stay strong and healthy.  Follow a nutrition plan rich in fruits and vegetables and low in fats and cholesterol.    -Be sure to eat 5-7 servings of fruits and vegetables each day.  -Find ways to stay active.  Try to get 150 minutes of moderate activity (where you are breathing faster and slightly sweating) each week.  -Try to maintain a body mass index (BMI) of 18.5-25 as this is considered a healthier weight range.  -Brush your teeth twice daily.  See a dentist every 6-12 months.  -Be sure to use sunblock with SPF 15 or greater when going outside for extended periods of time.  Sunblock should be used even when it is a cloudy day.  Do intermittent skin checks for any concerning skin changes.  Wearing a wide brimmed hat and sunglasses can also be helpful to protect your skin from the sun.  -Monitor for any abnormal skin  changes (such as new moles/spots, painful moles, changes in your old moles, wounds that will not heal, multiple colors noted in one lesion, lesions that are asymmetric or not circular, or anything that is concerning for you). If any of these are noted, please schedule an appointment to be seen.     -It is generally recommended for you to complete a health care directive or living will. These documents will be able to reflect your wishes and desire in the case that you are unable to express them yourself. Please let Dr. Trejo know if you would like some assistance with this process.    Please seek immediate medical attention (go to the emergency room or urgent care) for the following reasons: worsening symptoms, or any concerning changes.      --------------------------------------------------------------------------------------------------------------------    -We are always looking for ways to improve.  You may be selected to receive a survey regarding your visit today.  We encourage you to complete the survey and provide specific, constructive feedback to help us improve our processes.  Thank you for your time!  -Please review the contact information listed on the after visit summary and in the electronic chart.  Below is the phone number that we have on file.  If there are any changes that are needed to be made, please reach out to the clinic.  [phone] (home)         Patient Education   You have been provided the Preventive Care Advice document.    Additional copies can be found here: [email or web address]  Preventive Care Advice   This is general advice given by our system to help you stay healthy. However, your care team may have specific advice just for you. Please talk to your care team about your preventive care needs.  Nutrition  Eat 5 or more servings of fruits and vegetables each day.  Try wheat bread, brown rice and whole grain pasta (instead of white bread, rice, and pasta).  Get enough  calcium and vitamin D. Check the label on foods and aim for 100% of the RDA (recommended daily allowance).  Lifestyle  Exercise at least 150 minutes each week  (30 minutes a day, 5 days a week).  Do muscle strengthening activities 2 days a week. These help control your weight and prevent disease.  No smoking.  Wear sunscreen to prevent skin cancer.  Have a dental exam and cleaning every 6 months.  Yearly exams  See your health care team every year to talk about:  Any changes in your health.  Any medicines your care team has prescribed.  Preventive care, family planning, and ways to prevent chronic diseases.  Shots (vaccines)   HPV shots (up to age 26), if you've never had them before.  Hepatitis B shots (up to age 59), if you've never had them before.  COVID-19 shot: Get this shot when it's due.  Flu shot: Get a flu shot every year.  Tetanus shot: Get a tetanus shot every 10 years.  Pneumococcal, hepatitis A, and RSV shots: Ask your care team if you need these based on your risk.  Shingles shot (for age 50 and up)  General health tests  Diabetes screening:  Starting at age 35, Get screened for diabetes at least every 3 years.  If you are younger than age 35, ask your care team if you should be screened for diabetes.  Cholesterol test: At age 39, start having a cholesterol test every 5 years, or more often if advised.  Bone density scan (DEXA): At age 50, ask your care team if you should have this scan for osteoporosis (brittle bones).  Hepatitis C: Get tested at least once in your life.  STIs (sexually transmitted infections)  Before age 24: Ask your care team if you should be screened for STIs.  After age 24: Get screened for STIs if you're at risk. You are at risk for STIs (including HIV) if:  You are sexually active with more than one person.  You don't use condoms every time.  You or a partner was diagnosed with a sexually transmitted infection.  If you are at risk for HIV, ask about PrEP medicine to prevent  HIV.  Get tested for HIV at least once in your life, whether you are at risk for HIV or not.  Cancer screening tests  Cervical cancer screening: If you have a cervix, begin getting regular cervical cancer screening tests starting at age 21.  Breast cancer scan (mammogram): If you've ever had breasts, begin having regular mammograms starting at age 40. This is a scan to check for breast cancer.  Colon cancer screening: It is important to start screening for colon cancer at age 45.  Have a colonoscopy test every 10 years (or more often if you're at risk) Or, ask your provider about stool tests like a FIT test every year or Cologuard test every 3 years.  To learn more about your testing options, visit:   .  For help making a decision, visit:   https://bit.ly/sr36826.  Prostate cancer screening test: If you have a prostate, ask your care team if a prostate cancer screening test (PSA) at age 55 is right for you.  Lung cancer screening: If you are a current or former smoker ages 50 to 80, ask your care team if ongoing lung cancer screenings are right for you.  For informational purposes only. Not to replace the advice of your health care provider. Copyright   2023 [employer]. All rights reserved. Clinically reviewed by the [facility] Transitions Program. Retention Education 699294 - REV 01/24.

## 2024-11-21 LAB
ALBUMIN SERPL BCG-MCNC: 4 G/DL (ref 3.5–5.2)
ALP SERPL-CCNC: 91 U/L (ref 40–150)
ALT SERPL W P-5'-P-CCNC: 27 U/L (ref 0–70)
ANION GAP SERPL CALCULATED.3IONS-SCNC: 9 MMOL/L (ref 7–15)
AST SERPL W P-5'-P-CCNC: 39 U/L (ref 0–45)
BILIRUB DIRECT SERPL-MCNC: <0.2 MG/DL (ref 0–0.3)
BILIRUB SERPL-MCNC: 0.4 MG/DL
BUN SERPL-MCNC: 14 MG/DL (ref 8–23)
CALCIUM SERPL-MCNC: 9.5 MG/DL (ref 8.8–10.4)
CHLORIDE SERPL-SCNC: 104 MMOL/L (ref 98–107)
CHOLEST SERPL-MCNC: 130 MG/DL
CREAT SERPL-MCNC: 1.41 MG/DL (ref 0.67–1.17)
EGFRCR SERPLBLD CKD-EPI 2021: 51 ML/MIN/1.73M2
FASTING STATUS PATIENT QL REPORTED: ABNORMAL
FASTING STATUS PATIENT QL REPORTED: NORMAL
GLUCOSE SERPL-MCNC: 91 MG/DL (ref 70–99)
HCO3 SERPL-SCNC: 25 MMOL/L (ref 22–29)
HDLC SERPL-MCNC: 41 MG/DL
LDLC SERPL CALC-MCNC: 70 MG/DL
NONHDLC SERPL-MCNC: 89 MG/DL
POTASSIUM SERPL-SCNC: 4.8 MMOL/L (ref 3.4–5.3)
PROT SERPL-MCNC: 7.1 G/DL (ref 6.4–8.3)
SODIUM SERPL-SCNC: 138 MMOL/L (ref 135–145)
TRIGL SERPL-MCNC: 97 MG/DL

## 2024-11-22 ENCOUNTER — APPOINTMENT (OUTPATIENT)
Dept: INTERPRETER SERVICES | Facility: CLINIC | Age: 79
End: 2024-11-22
Payer: COMMERCIAL

## 2024-11-25 ENCOUNTER — PATIENT OUTREACH (OUTPATIENT)
Dept: GERIATRIC MEDICINE | Facility: CLINIC | Age: 79
End: 2024-11-25
Payer: COMMERCIAL

## 2024-11-25 NOTE — Clinical Note
Annual health risk assessment completed. No issues. See note for further details on community resources.   Thank you.  Isabel Brady RN [facility] [phone]

## 2024-11-26 ENCOUNTER — TELEPHONE (OUTPATIENT)
Dept: FAMILY MEDICINE | Facility: CLINIC | Age: 79
End: 2024-11-26
Payer: COMMERCIAL

## 2024-11-26 ASSESSMENT — LIFESTYLE VARIABLES
SKIP TO QUESTIONS 9-10: 1
AUDIT-C TOTAL SCORE: 0

## 2024-11-26 ASSESSMENT — PATIENT HEALTH QUESTIONNAIRE - PHQ9: SUM OF ALL RESPONSES TO PHQ QUESTIONS 1-9: 15

## 2024-11-26 NOTE — TELEPHONE ENCOUNTER
Arianna calling from [facility]. Per Arianna, patient is a poor historian and does not know his medications. Arianna is requesting patient's medication list. RN reviewed med list. Arianna verbalized understanding.     Nacho TAPIA RN  [facility]

## 2024-11-26 NOTE — PROGRESS NOTES
[facility] Care Coordination Contact    [facility] Home Visit Assessment     Home visit for Health Risk Assessment with Candice Pdae completed on November 25, 2024    Type of residence:: Apartment  Current living arrangement:: I live in a private home with family     Assessment completed with:: Patient, Children, Family    Current Care Plan  Member currently receiving the following home care services:     Member currently receiving the following community resources: PCA      Medication Review  Medication reconciliation completed in Epic: Yes  Medication set-up & administration: Family/informal caregiver sets up daily.  Family caregiver administers medications.  Medication Risk Assessment Medication (1 or more, place referral to MTM): Taking 1 or more high-risk medications for adults >65 years  MTM Referral Placed: No: member and family declined since member has been on same medications for quite some time    Mental/Behavioral Health   Depression Screening:      PHQ-9 Total Score: 15    Mental health DX:: Yes   Mental health DX how managed:: Medication    Member declines mental health services at this time.     Falls Assessment:   Fallen 2 or more times in the past year?: No   Any fall with injury in the past year?: No    ADL/IADL Dependencies:   Dependent ADLs:: Bathing, Dressing, Grooming, Positioning, Incontinence, Transfers, Wheelchair-with assist, Toileting, Ambulation-walker, Eating  Dependent IADLs:: Cleaning, Cooking, Laundry, Shopping, Meal Preparation, Medication Management, Money Management, Transportation, Incontinence    Health Plan sponsored benefits: [employer] [facility]+: Shared information regarding preventative health screening and health plan supplemental benefits/incentives. Reviewed medication disposal form.    PCA Assessment completed at visit: Yes Annual PCA assessment indicated 10 hours per day of PCA. This is an increase from the previous assessment.     Member needing assistance with eating  this year and requires someone to physically feed him.     Elderly Waiver Eligibility: Yes, but member declines EW services; will not open to EW    Care Plan & Recommendations: member will continue with PCA services which increased from 9.5 hours/day to 10 hours/day. Member declines mental health services at this time. No DME need at this time. Family will continue to provide informal support as needed.    [place].    Follow-Up Plan: Member informed of future contact, plan to f/u with member with a 6 month telephone assessment.  Contact information shared with member and family, encouraged member to call with any questions or concerns at any time.    [facility] continuum providers: Please see Snapshot and Care Management Flowsheets for Specific details of care plan.    This CC note routed to PCP, Jose Snyder Mai, RN  [facility]  [phone]

## 2024-11-29 ENCOUNTER — HOSPITAL ENCOUNTER (OUTPATIENT)
Dept: NUCLEAR MEDICINE | Facility: HOSPITAL | Age: 79
Discharge: HOME | End: 2024-11-29
Attending: FAMILY MEDICINE
Payer: COMMERCIAL

## 2024-11-29 ENCOUNTER — HOSPITAL ENCOUNTER (OUTPATIENT)
Dept: CARDIOLOGY | Facility: HOSPITAL | Age: 79
Discharge: HOME | End: 2024-11-29
Attending: FAMILY MEDICINE
Payer: COMMERCIAL

## 2024-11-29 DIAGNOSIS — R07.9 EXERTIONAL CHEST PAIN: ICD-10-CM

## 2024-11-29 DIAGNOSIS — R07.89 CHEST WALL PAIN: ICD-10-CM

## 2024-11-29 LAB
CV STRESS CURRENT BP HE: NORMAL
CV STRESS CURRENT HR HE: 59
CV STRESS CURRENT HR HE: 60
CV STRESS CURRENT HR HE: 60
CV STRESS CURRENT HR HE: 63
CV STRESS CURRENT HR HE: 65
CV STRESS CURRENT HR HE: 69
CV STRESS CURRENT HR HE: 70
CV STRESS CURRENT HR HE: 71
CV STRESS CURRENT HR HE: 71
CV STRESS CURRENT HR HE: 72
CV STRESS CURRENT HR HE: 74
CV STRESS DEVIATION TIME HE: NORMAL
CV STRESS ECHO PERCENT HR HE: NORMAL
CV STRESS EXERCISE STAGE HE: NORMAL
CV STRESS FINAL RESTING BP HE: NORMAL
CV STRESS FINAL RESTING HR HE: 69
CV STRESS MAX HR HE: 73
CV STRESS MAX TREADMILL GRADE HE: 0
CV STRESS MAX TREADMILL SPEED HE: 0
CV STRESS PEAK DIA BP HE: NORMAL
CV STRESS PEAK SYS BP HE: NORMAL
CV STRESS PHASE HE: NORMAL
CV STRESS PROTOCOL HE: NORMAL
CV STRESS RESTING PT POSITION HE: NORMAL
CV STRESS ST DEVIATION AMOUNT HE: NORMAL
CV STRESS ST DEVIATION ELEVATION HE: NORMAL
CV STRESS ST EVELATION AMOUNT HE: NORMAL
CV STRESS TEST TYPE HE: NORMAL
CV STRESS TOTAL STAGE TIME MIN 1 HE: NORMAL
RATE PRESSURE PRODUCT: 9563
STRESS ECHO BASELINE DIASTOLIC HE: 84
STRESS ECHO BASELINE HR: 58
STRESS ECHO BASELINE SYSTOLIC BP: 183
STRESS ECHO CALCULATED PERCENT HR: 51 %
STRESS ECHO LAST STRESS DIASTOLIC BP: 68
STRESS ECHO LAST STRESS HR: 71
STRESS ECHO LAST STRESS SYSTOLIC BP: 131
STRESS ECHO TARGET HR: 143

## 2024-11-29 PROCEDURE — 93016 CV STRESS TEST SUPVJ ONLY: CPT | Performed by: INTERNAL MEDICINE

## 2024-11-29 PROCEDURE — A9500 TC99M SESTAMIBI: HCPCS | Performed by: FAMILY MEDICINE

## 2024-11-29 PROCEDURE — 343N000001 HC RX 343 MED OP 636: Performed by: FAMILY MEDICINE

## 2024-11-29 PROCEDURE — 78452 HT MUSCLE IMAGE SPECT MULT: CPT | Mod: 26 | Performed by: INTERNAL MEDICINE

## 2024-11-29 PROCEDURE — 93017 CV STRESS TEST TRACING ONLY: CPT

## 2024-11-29 PROCEDURE — 250N000011 HC RX IP 250 OP 636: Performed by: FAMILY MEDICINE

## 2024-11-29 PROCEDURE — 78452 HT MUSCLE IMAGE SPECT MULT: CPT

## 2024-11-29 PROCEDURE — 93018 CV STRESS TEST I&R ONLY: CPT | Performed by: INTERNAL MEDICINE

## 2024-11-29 RX ORDER — REGADENOSON 0.08 MG/ML
0.4 INJECTION, SOLUTION INTRAVENOUS ONCE
Status: COMPLETED | OUTPATIENT
Start: 2024-11-29 | End: 2024-11-29

## 2024-11-29 RX ORDER — AMINOPHYLLINE 25 MG/ML
50-100 INJECTION, SOLUTION INTRAVENOUS
Status: DISCONTINUED | OUTPATIENT
Start: 2024-11-29 | End: 2024-11-30 | Stop reason: HOSPADM

## 2024-11-29 RX ADMIN — Medication 30.8 MILLICURIE: at 09:25

## 2024-11-29 RX ADMIN — REGADENOSON 0.4 MG: 0.08 INJECTION, SOLUTION INTRAVENOUS at 09:26

## 2024-11-29 RX ADMIN — Medication 8.5 MILLICURIE: at 08:11

## 2024-12-05 ENCOUNTER — PATIENT OUTREACH (OUTPATIENT)
Dept: GERIATRIC MEDICINE | Facility: CLINIC | Age: 79
End: 2024-12-05
Payer: COMMERCIAL

## 2024-12-05 NOTE — PROGRESS NOTES
[facility] Care Coordination Contact    Received after visit chart from care coordinator.  Completed following tasks: Mailed copy of support plan to member, Mailed [place] Choices signature sheet pages 3-4, Mailed Safe Medication Disposal , and Updated services in Database  , Provider Signature - No Support Plan Shared:  Member indicates that they do not want their support plan shared with any EW providers.    [facility]:  Emailed required PCA documents to [employer].  Faxed copy of PCA assessment to PCA Agency and mailed copy to member.     Martín Pinzon  Care Management Specialist  [facility]  [phone]

## 2024-12-05 NOTE — LETTER
December 5, 2024       CANDICE PDAE  [address]   [place] [address]      Dear Candice,    At [facility], we re dedicated to improving your health and wellness. Enclosed is the Support Plan developed with you on 11/25/24. Please review the Support Plan carefully.    As a reminder, during your visit we talked about:   Ways to manage your physical and mental health   Using health care to maintain and improve your health    Your preventive care needs      Remember to contact your care coordinator if you:   Are hospitalized or plan to be hospitalized    Have a fall     Have a change in your physical or mental health   Need help finding support or services    If you have questions or don t agree with your Support Plan, call me at [phone]. You can also call me if your needs change. TTY users call the [place] Relay at 468 or 1-759.363.5008 (oyouou-gf-jeptxo relay service).    Sincerely,         Isabel Brady RN    E-Mail:  [email or web address]  Phone: [phone]      [facility]                [id number] accepted     (06/2024)                [address] [place] [address]  [phone]  fax [phone]  [facility]

## 2024-12-26 LAB
ATRIAL RATE - MUSE: 61 BPM
DIASTOLIC BLOOD PRESSURE - MUSE: NORMAL MMHG
INTERPRETATION ECG - MUSE: NORMAL
P AXIS - MUSE: 53 DEGREES
PR INTERVAL - MUSE: 220 MS
QRS DURATION - MUSE: 86 MS
QT - MUSE: 458 MS
QTC - MUSE: 461 MS
R AXIS - MUSE: 20 DEGREES
SYSTOLIC BLOOD PRESSURE - MUSE: NORMAL MMHG
T AXIS - MUSE: 55 DEGREES
VENTRICULAR RATE- MUSE: 61 BPM

## 2025-02-25 ENCOUNTER — OFFICE VISIT (OUTPATIENT)
Dept: FAMILY MEDICINE | Facility: CLINIC | Age: 80
End: 2025-02-25
Payer: COMMERCIAL

## 2025-02-25 VITALS
BODY MASS INDEX: 24.95 KG/M2 | HEART RATE: 80 BPM | OXYGEN SATURATION: 97 % | WEIGHT: 127.08 LBS | HEIGHT: 60 IN | RESPIRATION RATE: 20 BRPM | DIASTOLIC BLOOD PRESSURE: 68 MMHG | SYSTOLIC BLOOD PRESSURE: 126 MMHG | TEMPERATURE: 97.8 F

## 2025-02-25 DIAGNOSIS — Z76.0 ENCOUNTER FOR MEDICATION REFILL: ICD-10-CM

## 2025-02-25 DIAGNOSIS — R42 VERTIGO: ICD-10-CM

## 2025-02-25 DIAGNOSIS — R07.9 EXERTIONAL CHEST PAIN: Primary | ICD-10-CM

## 2025-02-25 DIAGNOSIS — H10.45 CHRONIC ALLERGIC CONJUNCTIVITIS: ICD-10-CM

## 2025-02-25 DIAGNOSIS — H10.13 ALLERGIC CONJUNCTIVITIS, BILATERAL: ICD-10-CM

## 2025-02-25 DIAGNOSIS — Z23 NEED FOR VACCINATION: ICD-10-CM

## 2025-02-25 PROCEDURE — 3078F DIAST BP <80 MM HG: CPT | Performed by: FAMILY MEDICINE

## 2025-02-25 PROCEDURE — 99214 OFFICE O/P EST MOD 30 MIN: CPT | Performed by: FAMILY MEDICINE

## 2025-02-25 PROCEDURE — T1013 SIGN LANG/ORAL INTERPRETER: HCPCS

## 2025-02-25 PROCEDURE — 3074F SYST BP LT 130 MM HG: CPT | Performed by: FAMILY MEDICINE

## 2025-02-25 PROCEDURE — 91320 SARSCV2 VAC 30MCG TRS-SUC IM: CPT | Performed by: FAMILY MEDICINE

## 2025-02-25 PROCEDURE — 90480 ADMN SARSCOV2 VAC 1/ONLY CMP: CPT | Performed by: FAMILY MEDICINE

## 2025-02-25 PROCEDURE — G2211 COMPLEX E/M VISIT ADD ON: HCPCS | Performed by: FAMILY MEDICINE

## 2025-02-25 RX ORDER — MECLIZINE HYDROCHLORIDE 25 MG/1
25 TABLET ORAL 3 TIMES DAILY PRN
Qty: 90 TABLET | Refills: 5 | Status: SHIPPED | OUTPATIENT
Start: 2025-02-25

## 2025-02-25 RX ORDER — KETOTIFEN FUMARATE 0.35 MG/ML
1 SOLUTION/ DROPS OPHTHALMIC 2 TIMES DAILY
Qty: 10 ML | Refills: 11 | Status: SHIPPED | OUTPATIENT
Start: 2025-02-25

## 2025-02-25 NOTE — PROGRESS NOTES
ASSESMENT AND PLAN:  Diagnoses and all orders for this visit:  Exertional chest pain  Noncardiac.  Reviewed his cardiac test results with him today in detail with the help of a professional .  Likely musculoskeletal or gastrointestinal or both.  Currently taking omeprazole just once daily, will increase to twice daily as had previously been prescribed.  Observation.  Vertigo  Restart meclizine.  -     meclizine (ANTIVERT) 25 MG tablet; Take 1 tablet (25 mg) by mouth 3 times daily as needed for dizziness or nausea.  Chronic allergic conjunctivitis/Allergic conjunctivitis, bilateral  -     ketotifen fumarate 0.035% 0.035 % SOLN ophthalmic solution; Place 1 drop into both eyes 2 times daily.  Need for vaccination  Immunization review and counseling done with the patient with the help of a professional .  -     COVID-19 12+ (PFIZER)      Reviewed the risks and benefits of the treatment plan with the patient and/or caregiver and we discussed indications for routine and emergent follow-up.        SUBJECTIVE: Patient reports that he continues to get some chest pain, sometimes with exertion, it has not gotten worse since last visit.  He did complete his cardiac testing.  At different times he is getting a spinning sensation dizziness.  This has been a chronic issue for him and it had responded well to meclizine in the past but he ran out of that medication.  Patient also reports he has been getting itchy watery eyes on both sides, no sudden visual changes.    No past medical history on file.  Patient Active Problem List   Diagnosis    Lacunar infarction (H)    Refugee health examination    Gastritis    Adjustment disorder with depressed mood    Right shoulder tendonitis    History of GI bleed - status post emergent [facility]    Hypertension    Osteoarthritis of knees, bilateral    SBO (small bowel obstruction) (H)    Low magnesium level    Hypoglycemia    Dental disease     "Hypercholesteremia    Insomnia, unspecified type    Vertigo    Decreased hearing of both ears    Exertional chest pain    Chest wall pain    Chronic pain of both knees    Hx SBO    Fatty liver     Current Outpatient Medications   Medication Sig Dispense Refill    acetaminophen (TYLENOL) 500 MG tablet Take 1-2 tablets (500-1,000 mg) by mouth every 6 hours as needed for fever or pain. (Max of 3,000 mg/day) 100 tablet 3    amLODIPine (NORVASC) 5 MG tablet Take 1 tablet (5 mg) by mouth daily 90 tablet 3    atorvastatin (LIPITOR) 80 MG tablet Take 1 tablet (80 mg) by mouth daily 90 tablet 3    escitalopram (LEXAPRO) 5 MG tablet Take 1 tablet (5 mg) by mouth daily 90 tablet 3    ketotifen fumarate 0.035% 0.035 % SOLN ophthalmic solution Place 1 drop into both eyes 2 times daily. 10 mL 11    meclizine (ANTIVERT) 25 MG tablet Take 1 tablet (25 mg) by mouth 3 times daily as needed for dizziness or nausea. 90 tablet 5    omeprazole (PRILOSEC) 20 MG DR capsule Take 1 capsule (20 mg) by mouth 2 times daily (before meals) 180 capsule 3    traZODone (DESYREL) 50 MG tablet Take 0.5-1 tablets (25-50 mg) by mouth at bedtime 90 tablet 3     History   Smoking Status    Former    Types: Cigarettes   Smokeless Tobacco    Former       OBJECTICE: /68   Pulse 80   Temp 97.8  F (36.6  C) (Oral)   Resp 20   Ht 1.485 m (4' 10.47\")   Wt 57.6 kg (127 lb 1.3 oz)   SpO2 97%   BMI 26.14 kg/m       No results found for this or any previous visit (from the past 24 hours).     GEN-alert, appropriate, in no apparent distress   HEENT-mild conjunctivitis bilaterally, no corneal opacities   CV-regular rate and rhythm   RESP-lungs clear          Signed Electronically by: Jose Snyder MD    "

## 2025-04-30 ENCOUNTER — PATIENT OUTREACH (OUTPATIENT)
Dept: GERIATRIC MEDICINE | Facility: CLINIC | Age: 80
End: 2025-04-30
Payer: COMMERCIAL

## 2025-04-30 NOTE — PROGRESS NOTES
[facility] Care Coordination Contact      [facility] Six-Month Telephone Assessment    6 month telephone assessment completed on 04/30/25.    ER visits: No  Hospitalizations: No  TCU stays: No  Significant health status changes: no  Falls/Injuries: No  ADL/IADL changes: No  Changes in services: No    Caregiver Assessment follow up:  n/a    Goals: See Support Plan for goal progress documentation.      Will see member in 6 months for an annual health risk assessment.   Encouraged member to call CC with any questions or concerns in the meantime.     Isabel Brady RN  [facility]  [phone]

## 2025-06-30 ENCOUNTER — PATIENT OUTREACH (OUTPATIENT)
Dept: GERIATRIC MEDICINE | Facility: CLINIC | Age: 80
End: 2025-06-30
Payer: COMMERCIAL

## 2025-06-30 NOTE — PROGRESS NOTES
[facility] Care Coordination Contact    Care coordinator still [facility] whether they completed member's service delivery plan or not. Per Florentin on Jan 15th, they were in the process of scheduling member for his consultation services. No plan uploaded in [employer]. Care coordinator had emailed Florentin on June 4th and June 30 but did not hear back. Care coordinator even called several times in June but was unable to leave message for Florentin due to full mailbox.     Member's PCA auth ends 6/30/25. Talked to [facility] home care and care coordinator will auth PCA for another 3 months to avoid gap in service. [facility] informed member that family stated that member already completed consultation services.    PCA 3 month temp auth 7/1/25 to 09/30/25 at 10 hours/day.    Isabel Brady RN  [facility]  [phone]

## 2025-07-01 ENCOUNTER — PATIENT OUTREACH (OUTPATIENT)
Dept: GERIATRIC MEDICINE | Facility: CLINIC | Age: 80
End: 2025-07-01
Payer: COMMERCIAL

## 2025-07-01 NOTE — PROGRESS NOTES
[facility] Care Coordination Contact    Received authorization task from care coordinator.  Completed following tasks: Updated services in Database.  [facility]:  Emailed required CFSS documents to [facility].    Mahogany Guy    Care Management Specialist   [facility]  [phone]

## 2025-07-16 ENCOUNTER — PATIENT OUTREACH (OUTPATIENT)
Dept: GERIATRIC MEDICINE | Facility: CLINIC | Age: 80
End: 2025-07-16
Payer: COMMERCIAL

## 2025-07-16 NOTE — PROGRESS NOTES
[facility] Care Coordination Contact    [facility]: Member new enrollee with CFSS.  Emailed CFSS assessment to [facility] for authorization.    Vianca Thomason  Care Management Specialist  [facility]  [phone]